# Patient Record
Sex: FEMALE | Race: OTHER
[De-identification: names, ages, dates, MRNs, and addresses within clinical notes are randomized per-mention and may not be internally consistent; named-entity substitution may affect disease eponyms.]

---

## 2020-02-11 ENCOUNTER — HOSPITAL ENCOUNTER (INPATIENT)
Dept: HOSPITAL 54 - GPS | Age: 55
LOS: 17 days | Discharge: NURSING, REHAB OR LONG-TERM CARE | DRG: 885 | End: 2020-02-28
Attending: INTERNAL MEDICINE | Admitting: PSYCHIATRY & NEUROLOGY
Payer: MEDICARE

## 2020-02-11 VITALS — WEIGHT: 95 LBS | BODY MASS INDEX: 18.65 KG/M2 | HEIGHT: 60 IN

## 2020-02-11 VITALS — DIASTOLIC BLOOD PRESSURE: 62 MMHG | SYSTOLIC BLOOD PRESSURE: 115 MMHG

## 2020-02-11 DIAGNOSIS — F41.9: ICD-10-CM

## 2020-02-11 DIAGNOSIS — Z73.6: ICD-10-CM

## 2020-02-11 DIAGNOSIS — Z91.14: ICD-10-CM

## 2020-02-11 DIAGNOSIS — E22.1: ICD-10-CM

## 2020-02-11 DIAGNOSIS — E88.09: ICD-10-CM

## 2020-02-11 DIAGNOSIS — R45.851: ICD-10-CM

## 2020-02-11 DIAGNOSIS — R73.9: ICD-10-CM

## 2020-02-11 DIAGNOSIS — F29: ICD-10-CM

## 2020-02-11 DIAGNOSIS — E44.0: ICD-10-CM

## 2020-02-11 DIAGNOSIS — F33.3: Primary | ICD-10-CM

## 2020-02-11 DIAGNOSIS — I10: ICD-10-CM

## 2020-02-11 NOTE — NUR
PATIENT IS A 54 YEAR OLD  FEMALE BROUGHT INTO THE HOSPITAL BY AMBULANCE FROM [facility]. PATIENT IS ADMITTED ON A 5150 HOLD FOR GRAVELY DISABLED. PATIENT FOR THE 
LAST 2-3 WEEKS HAS BEEN HAVING NEW ONSET AUDITORY HALLUCINATIONS OF MALE VOICE THREATENING 
HER THAT SHE WILL BE KILLED OR HURT IF SHE EATS OR DRINKS ANYTHING. PATIENT HAS BEEN EATING 
AND DRINKING VERY LITTLE FOR THE LAST FEW DAYS. UPON FACE TO FACE ASSESSMENT PATIENT IS 
PARANOID WITH DELAYED SPEECH. PATIENT SEEMS TO BE REACTING TO INTERNAL STIMULI. PATIENT 
DENIES SI/HI AND VISUAL HALLUCINATIONS. INFORMED DR SCHAEFER AND JEAN MARIE URBANO NP OF 
ADMISSION. PATIENTS RIGHTS HANDBOOK GIVEN AND GUIDE TO PRESCRIPTIONS GIVEN. PATIENT SIGNED 
ADMISSION PAPERWORK. BROTHER HAS BEEN NOTIFIED OF ADMISSION. ENVIRONMENTAL CHECK COMPLETE. 
WILL CONTINUE TO MONITOR PT Q15 FOR MOOD, SAFETY AND BEHAVIOR

## 2020-02-12 VITALS — SYSTOLIC BLOOD PRESSURE: 132 MMHG | DIASTOLIC BLOOD PRESSURE: 81 MMHG

## 2020-02-12 VITALS — DIASTOLIC BLOOD PRESSURE: 77 MMHG | SYSTOLIC BLOOD PRESSURE: 118 MMHG

## 2020-02-12 VITALS — SYSTOLIC BLOOD PRESSURE: 119 MMHG | DIASTOLIC BLOOD PRESSURE: 78 MMHG

## 2020-02-12 LAB
ALBUMIN SERPL BCP-MCNC: 3 G/DL (ref 3.4–5)
ALP SERPL-CCNC: 44 U/L (ref 46–116)
ALT SERPL W P-5'-P-CCNC: 14 U/L (ref 12–78)
AST SERPL W P-5'-P-CCNC: 12 U/L (ref 15–37)
BILIRUB SERPL-MCNC: 0.4 MG/DL (ref 0.2–1)
BUN SERPL-MCNC: 14 MG/DL (ref 7–18)
CALCIUM SERPL-MCNC: 8.5 MG/DL (ref 8.5–10.1)
CHLORIDE SERPL-SCNC: 104 MMOL/L (ref 98–107)
CHOLEST SERPL-MCNC: 148 MG/DL (ref ?–200)
CO2 SERPL-SCNC: 29 MMOL/L (ref 21–32)
CREAT SERPL-MCNC: 0.7 MG/DL (ref 0.6–1.3)
GLUCOSE SERPL-MCNC: 107 MG/DL (ref 74–106)
HDLC SERPL-MCNC: 37 MG/DL (ref 40–60)
LDLC SERPL DIRECT ASSAY-MCNC: 99 MG/DL (ref 0–99)
POTASSIUM SERPL-SCNC: 3.5 MMOL/L (ref 3.5–5.1)
PROT SERPL-MCNC: 5.6 G/DL (ref 6.4–8.2)
SODIUM SERPL-SCNC: 139 MMOL/L (ref 136–145)
TRIGL SERPL-MCNC: 76 MG/DL (ref 30–150)

## 2020-02-12 RX ADMIN — CALCIUM CARBONATE-CHOLECALCIFEROL TAB 250 MG-125 UNIT SCH UDTAB: 250-125 TAB at 08:18

## 2020-02-12 RX ADMIN — LORAZEPAM PRN MG: 0.5 TABLET ORAL at 08:19

## 2020-02-12 RX ADMIN — GABAPENTIN SCH MG: 300 CAPSULE ORAL at 18:06

## 2020-02-12 RX ADMIN — DULOXETINE HYDROCHLORIDE SCH MG: 30 CAPSULE, DELAYED RELEASE ORAL at 11:23

## 2020-02-12 RX ADMIN — GABAPENTIN SCH MG: 300 CAPSULE ORAL at 13:20

## 2020-02-12 RX ADMIN — LISINOPRIL SCH MG: 10 TABLET ORAL at 08:29

## 2020-02-12 RX ADMIN — QUETIAPINE SCH MG: 25 TABLET, FILM COATED ORAL at 21:25

## 2020-02-12 RX ADMIN — CALCIUM CARBONATE-CHOLECALCIFEROL TAB 250 MG-125 UNIT SCH UDTAB: 250-125 TAB at 18:06

## 2020-02-12 RX ADMIN — GABAPENTIN SCH MG: 300 CAPSULE ORAL at 08:20
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
## 2020-02-12 NOTE — NUR
Family Contact: SW called the pts brother, Anatoliy ([phone]), and discussed the pts 
treatment and discharge plan. Pts brother provided the SW with a complete history and then 
stated that he is not sure that home alone is a safe option for the pt. SW informed him of 
the medications that the pt is being started on and then stated that we will assess the pts 
discharge plan day by day.

## 2020-02-12 NOTE — NUR
Initial Discharge Plan: Pt currently resides at her apartment alone located at [address], [address] [address]; ([phone]). Per pt, she would like to return to 
his home. AAMIR spoke to the pts brother, Anatoliy ([phone]), who stated that he is not 
sure if that is a safe option at this point. AAMIR will work with the pt and the MD regarding 
appropriate discharge planning. AAMIR will form a safe and proper discharge.

## 2020-02-12 NOTE — NUR
PATIENT RESTING IN BED COMFORTABLY, PT.IS COOPERATIVE, FEELING DEPRESSED, DENIES SI/HI/AVH 
AT THIS TIME. NO AGITATION NOTED.ENCOURAGED FOR VERBALIZATION OF FEELINGS. SAFETY 
PRECAUTIONS IMPLEMENTED. WILL CONTINUE TO MONITOR Q15MIN ROUNDS FOR SAFETY AND BEHAVIOR.

## 2020-02-13 VITALS — SYSTOLIC BLOOD PRESSURE: 123 MMHG | DIASTOLIC BLOOD PRESSURE: 80 MMHG

## 2020-02-13 VITALS — SYSTOLIC BLOOD PRESSURE: 102 MMHG | DIASTOLIC BLOOD PRESSURE: 64 MMHG

## 2020-02-13 VITALS — SYSTOLIC BLOOD PRESSURE: 108 MMHG | DIASTOLIC BLOOD PRESSURE: 56 MMHG

## 2020-02-13 RX ADMIN — QUETIAPINE SCH MG: 25 TABLET, FILM COATED ORAL at 21:12

## 2020-02-13 RX ADMIN — CALCIUM CARBONATE-CHOLECALCIFEROL TAB 250 MG-125 UNIT SCH UDTAB: 250-125 TAB at 08:36

## 2020-02-13 RX ADMIN — GABAPENTIN SCH MG: 300 CAPSULE ORAL at 08:36

## 2020-02-13 RX ADMIN — GABAPENTIN SCH MG: 300 CAPSULE ORAL at 12:54

## 2020-02-13 RX ADMIN — LISINOPRIL SCH MG: 10 TABLET ORAL at 08:36

## 2020-02-13 RX ADMIN — CALCIUM CARBONATE-CHOLECALCIFEROL TAB 250 MG-125 UNIT SCH UDTAB: 250-125 TAB at 16:36

## 2020-02-13 RX ADMIN — GABAPENTIN SCH MG: 300 CAPSULE ORAL at 16:36

## 2020-02-13 RX ADMIN — DULOXETINE HYDROCHLORIDE SCH MG: 30 CAPSULE, DELAYED RELEASE ORAL at 08:36

## 2020-02-13 NOTE — NUR
GPS/NURSING NOTES:



PT. IN HER ROOM AWAKE. NO DISTRESS OR AGITATION NOTED. QUIET AND COOPERATIVE AT THIS TIME. 
CONFUSED AT TIMES. REALITY ORIENTATION DONE. NO C/O PAIN OR DISCOMFORT. SAFETY ENVIRONMENT 
OBSERVED AT ALL TIMES. WILL CONTINUE TO MONITOR Q 15 MIN FOR SAFETY AND BEHAVIOR.

## 2020-02-13 NOTE — NUR
RN NOTES : PT. RESETING IN HER BED, NO ACUTE DISTRESS NOTED , DENIED ANY DISCOMFORT AT THIS 
TIME , IN DURING SHIFT NO BEHAVIOR PROBLEMS NOTED , ENDORSE TO DAY NURSE FOR CONTINUIYT OF 
CARE.

## 2020-02-13 NOTE — NUR
GROUP NOTE: SW encouraged pt to attend group on this present day discussing "discharge 
planning." Pt refused to attend stating, "I'll think about going." SW attempted to provide 
intervention and discuss pts discharge plan and pt responded, "I already have that figured 
out no need to worry."

## 2020-02-14 VITALS — SYSTOLIC BLOOD PRESSURE: 96 MMHG | DIASTOLIC BLOOD PRESSURE: 59 MMHG

## 2020-02-14 VITALS — SYSTOLIC BLOOD PRESSURE: 104 MMHG | DIASTOLIC BLOOD PRESSURE: 65 MMHG

## 2020-02-14 RX ADMIN — LISINOPRIL SCH MG: 10 TABLET ORAL at 08:16

## 2020-02-14 RX ADMIN — GABAPENTIN SCH MG: 300 CAPSULE ORAL at 13:08

## 2020-02-14 RX ADMIN — CALCIUM CARBONATE-CHOLECALCIFEROL TAB 250 MG-125 UNIT SCH UDTAB: 250-125 TAB at 08:15

## 2020-02-14 RX ADMIN — QUETIAPINE SCH MG: 25 TABLET, FILM COATED ORAL at 16:34

## 2020-02-14 RX ADMIN — QUETIAPINE SCH MG: 25 TABLET, FILM COATED ORAL at 09:51

## 2020-02-14 RX ADMIN — LORAZEPAM PRN MG: 0.5 TABLET ORAL at 08:26

## 2020-02-14 RX ADMIN — CALCIUM CARBONATE-CHOLECALCIFEROL TAB 250 MG-125 UNIT SCH UDTAB: 250-125 TAB at 16:34

## 2020-02-14 RX ADMIN — DULOXETINE HYDROCHLORIDE SCH MG: 30 CAPSULE, DELAYED RELEASE ORAL at 08:15

## 2020-02-14 RX ADMIN — GABAPENTIN SCH MG: 300 CAPSULE ORAL at 16:34

## 2020-02-14 RX ADMIN — QUETIAPINE SCH MG: 25 TABLET, FILM COATED ORAL at 22:00

## 2020-02-14 RX ADMIN — GABAPENTIN SCH MG: 300 CAPSULE ORAL at 08:15

## 2020-02-14 NOTE — NUR
GPS OPENING NOTE:



PATIENT IN BED RESTING COMFORTABLY, IN NO APPARENT DISTRESS NOTED. ALERT AND ORIENTED X3, 
PATIENT IS CALM AND COOPERATIVE WITH CARE. SAFETY PRECAUTIONS IMPLEMENTED. BED ALARM ON AND 
IN LOCKED POSITION. WILL CONTINUE TO MONITOR FOR PATIENT'S SAFETY.

## 2020-02-14 NOTE — NUR
GPS NURSING NOTE: 



SCHEDULED SEROQUEL 50MG PO FOR TONIGHT WAS NOT GIVEN/ADMINISTER DUE TO BLOOD PRESSURE 96/59. 
WILL CONTINUE TO MONITOR.

## 2020-02-15 VITALS — DIASTOLIC BLOOD PRESSURE: 72 MMHG | SYSTOLIC BLOOD PRESSURE: 111 MMHG

## 2020-02-15 VITALS — SYSTOLIC BLOOD PRESSURE: 105 MMHG | DIASTOLIC BLOOD PRESSURE: 68 MMHG

## 2020-02-15 VITALS — DIASTOLIC BLOOD PRESSURE: 74 MMHG | SYSTOLIC BLOOD PRESSURE: 124 MMHG

## 2020-02-15 RX ADMIN — CALCIUM CARBONATE-CHOLECALCIFEROL TAB 250 MG-125 UNIT SCH UDTAB: 250-125 TAB at 17:17

## 2020-02-15 RX ADMIN — LISINOPRIL SCH MG: 10 TABLET ORAL at 08:20

## 2020-02-15 RX ADMIN — QUETIAPINE SCH MG: 25 TABLET, FILM COATED ORAL at 08:19

## 2020-02-15 RX ADMIN — GABAPENTIN SCH MG: 300 CAPSULE ORAL at 13:29

## 2020-02-15 RX ADMIN — DULOXETINE HYDROCHLORIDE SCH MG: 30 CAPSULE, DELAYED RELEASE ORAL at 08:19

## 2020-02-15 RX ADMIN — GABAPENTIN SCH MG: 300 CAPSULE ORAL at 08:19

## 2020-02-15 RX ADMIN — GABAPENTIN SCH MG: 300 CAPSULE ORAL at 17:17

## 2020-02-15 RX ADMIN — CALCIUM CARBONATE-CHOLECALCIFEROL TAB 250 MG-125 UNIT SCH UDTAB: 250-125 TAB at 08:19

## 2020-02-15 RX ADMIN — QUETIAPINE SCH MG: 25 TABLET, FILM COATED ORAL at 21:07

## 2020-02-15 RX ADMIN — QUETIAPINE SCH MG: 25 TABLET, FILM COATED ORAL at 17:17

## 2020-02-15 NOTE — NUR
GPS RN CLOSING NOTE:



PATIENT IN BED ASLEEP, AROUSES EASILY. NO ACUTE DISTRESS NOTED. DENIES PAIN OR DISCOMFORT. 
NO AGITATION NOTED. SAFETY PRECAUTIONS IMPLEMENTED. BED ALARM ON AND IN LOCKED POSITION. 
WILL CONTINUE TO MONITOR FOR PT'S SAFETY.

## 2020-02-15 NOTE — NUR
GPS OPENING NOTE:



PATIENT IN BED RESTING COMFORTABLY, IN NO APPARENT DISTRESS NOTED. ALERT AND ORIENTED X3, 
PATIENT IS CALM, COOPERATIVE WITH CARE. FEELING DEPRESSED, ISOLATIVE. VERBALIZATION OF 
FEELINGS ENCOURAGED. SAFETY PRECAUTIONS IMPLEMENTED. BED ALARM ON AND IN LOCKED POSITION. 
WILL CONTINUE TO MONITOR FOR PATIENT'S SAFETY.

## 2020-02-16 VITALS — DIASTOLIC BLOOD PRESSURE: 83 MMHG | SYSTOLIC BLOOD PRESSURE: 153 MMHG

## 2020-02-16 VITALS — SYSTOLIC BLOOD PRESSURE: 134 MMHG | DIASTOLIC BLOOD PRESSURE: 88 MMHG

## 2020-02-16 VITALS — DIASTOLIC BLOOD PRESSURE: 79 MMHG | SYSTOLIC BLOOD PRESSURE: 118 MMHG

## 2020-02-16 LAB
BASOPHILS # BLD AUTO: 0 /CMM (ref 0–0.2)
BASOPHILS NFR BLD AUTO: 0.6 % (ref 0–2)
BUN SERPL-MCNC: 15 MG/DL (ref 7–18)
CALCIUM SERPL-MCNC: 9 MG/DL (ref 8.5–10.1)
CHLORIDE SERPL-SCNC: 102 MMOL/L (ref 98–107)
CO2 SERPL-SCNC: 28 MMOL/L (ref 21–32)
CREAT SERPL-MCNC: 0.8 MG/DL (ref 0.6–1.3)
EOSINOPHIL NFR BLD AUTO: 1.5 % (ref 0–6)
GLUCOSE SERPL-MCNC: 133 MG/DL (ref 74–106)
HCT VFR BLD AUTO: 44 % (ref 33–45)
HGB BLD-MCNC: 14.7 G/DL (ref 11.5–14.8)
LYMPHOCYTES NFR BLD AUTO: 0.9 /CMM (ref 0.8–4.8)
LYMPHOCYTES NFR BLD AUTO: 12.9 % (ref 20–44)
MCHC RBC AUTO-ENTMCNC: 33 G/DL (ref 31–36)
MCV RBC AUTO: 92 FL (ref 82–100)
MONOCYTES NFR BLD AUTO: 0.5 /CMM (ref 0.1–1.3)
MONOCYTES NFR BLD AUTO: 7.6 % (ref 2–12)
NEUTROPHILS # BLD AUTO: 5.3 /CMM (ref 1.8–8.9)
NEUTROPHILS NFR BLD AUTO: 77.4 % (ref 43–81)
PLATELET # BLD AUTO: 244 /CMM (ref 150–450)
POTASSIUM SERPL-SCNC: 4.2 MMOL/L (ref 3.5–5.1)
RBC # BLD AUTO: 4.8 MIL/UL (ref 4–5.2)
SODIUM SERPL-SCNC: 140 MMOL/L (ref 136–145)
WBC NRBC COR # BLD AUTO: 6.9 K/UL (ref 4.3–11)

## 2020-02-16 RX ADMIN — GABAPENTIN SCH MG: 300 CAPSULE ORAL at 12:37

## 2020-02-16 RX ADMIN — QUETIAPINE SCH MG: 25 TABLET, FILM COATED ORAL at 09:02

## 2020-02-16 RX ADMIN — QUETIAPINE SCH MG: 25 TABLET, FILM COATED ORAL at 21:18

## 2020-02-16 RX ADMIN — QUETIAPINE SCH MG: 25 TABLET, FILM COATED ORAL at 13:10

## 2020-02-16 RX ADMIN — GABAPENTIN SCH MG: 300 CAPSULE ORAL at 17:48

## 2020-02-16 RX ADMIN — DULOXETINE HYDROCHLORIDE SCH MG: 30 CAPSULE, DELAYED RELEASE ORAL at 09:01

## 2020-02-16 RX ADMIN — LISINOPRIL SCH MG: 10 TABLET ORAL at 09:03

## 2020-02-16 RX ADMIN — QUETIAPINE SCH MG: 25 TABLET, FILM COATED ORAL at 17:48

## 2020-02-16 RX ADMIN — GABAPENTIN SCH MG: 300 CAPSULE ORAL at 09:01

## 2020-02-16 RX ADMIN — CALCIUM CARBONATE-CHOLECALCIFEROL TAB 250 MG-125 UNIT SCH UDTAB: 250-125 TAB at 09:06

## 2020-02-16 RX ADMIN — CALCIUM CARBONATE-CHOLECALCIFEROL TAB 250 MG-125 UNIT SCH UDTAB: 250-125 TAB at 17:48

## 2020-02-17 VITALS — DIASTOLIC BLOOD PRESSURE: 77 MMHG | SYSTOLIC BLOOD PRESSURE: 137 MMHG

## 2020-02-17 VITALS — DIASTOLIC BLOOD PRESSURE: 53 MMHG | SYSTOLIC BLOOD PRESSURE: 82 MMHG

## 2020-02-17 VITALS — SYSTOLIC BLOOD PRESSURE: 74 MMHG | DIASTOLIC BLOOD PRESSURE: 48 MMHG

## 2020-02-17 VITALS — SYSTOLIC BLOOD PRESSURE: 90 MMHG | DIASTOLIC BLOOD PRESSURE: 58 MMHG

## 2020-02-17 VITALS — SYSTOLIC BLOOD PRESSURE: 86 MMHG | DIASTOLIC BLOOD PRESSURE: 54 MMHG

## 2020-02-17 RX ADMIN — GABAPENTIN SCH MG: 300 CAPSULE ORAL at 17:00

## 2020-02-17 RX ADMIN — CALCIUM CARBONATE-CHOLECALCIFEROL TAB 250 MG-125 UNIT SCH UDTAB: 250-125 TAB at 17:54

## 2020-02-17 RX ADMIN — LISINOPRIL SCH MG: 10 TABLET ORAL at 08:59

## 2020-02-17 RX ADMIN — QUETIAPINE SCH MG: 25 TABLET, FILM COATED ORAL at 09:29

## 2020-02-17 RX ADMIN — QUETIAPINE SCH MG: 25 TABLET, FILM COATED ORAL at 12:28

## 2020-02-17 RX ADMIN — QUETIAPINE SCH MG: 25 TABLET, FILM COATED ORAL at 08:10

## 2020-02-17 RX ADMIN — DULOXETINE HYDROCHLORIDE SCH MG: 30 CAPSULE, DELAYED RELEASE ORAL at 08:10

## 2020-02-17 RX ADMIN — QUETIAPINE SCH MG: 25 TABLET, FILM COATED ORAL at 17:00

## 2020-02-17 RX ADMIN — CALCIUM CARBONATE-CHOLECALCIFEROL TAB 250 MG-125 UNIT SCH UDTAB: 250-125 TAB at 08:10

## 2020-02-17 RX ADMIN — GABAPENTIN SCH MG: 300 CAPSULE ORAL at 12:29

## 2020-02-17 RX ADMIN — LORAZEPAM PRN MG: 0.5 TABLET ORAL at 08:11

## 2020-02-17 RX ADMIN — QUETIAPINE SCH MG: 25 TABLET, FILM COATED ORAL at 21:21

## 2020-02-17 RX ADMIN — GABAPENTIN SCH MG: 300 CAPSULE ORAL at 08:10

## 2020-02-17 NOTE — NUR
GPS RN NOTE: BLOOD PRESSURE



PATIENT AWAKE, ALERT AND ORIENTED X 3, CALM, COOPERATIVE, NO AGITATION NOTED, DENIES SI/HI, 
BP=90/57 P=87 R=20. NO SOB, NO ACUTE DISTRESS, BREATHING EVEN AND UNLABORED, NO S/S OF PAIN 
AND DISCOMFORT, WILL CONTINUE TO MONITOR Q15 MINS FOR SAFETY

## 2020-02-17 NOTE — NUR
GPS RN NOTE: OPENING NOTES

RECEIVED PATIENT ASLEEP IN BED, ARAUSABLE TO VERBAL AND TACTILE STIMULI, ALERT AND ORIENTED 
X 2-3, CALM, DEPRESSED MOOD, HEARING VOICES AND STATED "THEY LET ME DO THINGS". REALITY 
ORIENTATION PROVIDED, REDIRECTED THE PATIENT, DENIES SI/HI, ON IV BOLUS D/T LOW BLOOD 
PRESSURE, IV SITE INTACT AND PATENT WITH NO S/S OF INFILTRATION NOTED. LATEST BP=79/53 P=70 
R=20 P/L=O,  NO SOB, NO ACUTE DISTRESS, BREATHING EVEN AND UNLABORED, NO S/S OF PAIN AND 
DISCOMFORT, WILL CONTINUE TO MONITOR Q15 MINS FOR SAFETY

## 2020-02-17 NOTE — NUR
GPS NURSING NOTE:

RECEIVED PT IN BED RESTING. A/OX3, NO APPARENT DISTRESS NOTED. PT.IS

COOPERATIVE WITH CARE AND COMPLIANT WITH DAILY MEDS, PATIENT REPORTS AUDITORY HALLUCINATIONS 
THAT ARE THREATENING. VOICE TELLING HER NOT TO EAT BUT SHE WAS ABLE TO EAT A FULL MEAL FOR 
BREAKFAST. PATIENT STATED FEELING SUICIDAL DUE TO AUDITORY HALLUCINATIONS BUT DENIES PLAN. 
VERBALIZATION OF FEELINGS ENCOURAGED. SAFETY PRECAUTIONS IMPLEMENTED. WILL CONTINUE TO 
MONITOR Q15MIN  FOR SAFETY AND BEHAVIOR.

## 2020-02-17 NOTE — NUR
DR SILVA AWARE OF BP DROPPED 77/50. ORDERED COFFEE. ADMINISTERED. PATIENT BP INCREASED 
TO 92/50. INCREASED PO INTAKE. AT 1830 BP DROPPED TO 74/56. DR. SILVA PAGED AGAIN

## 2020-02-17 NOTE — NUR
gps rn note;

hold psych meds per Dr Khan until Dr Khan is able to see pt tomorrow

bp currently 92/50

## 2020-02-18 VITALS — DIASTOLIC BLOOD PRESSURE: 84 MMHG | SYSTOLIC BLOOD PRESSURE: 126 MMHG

## 2020-02-18 VITALS — DIASTOLIC BLOOD PRESSURE: 72 MMHG | SYSTOLIC BLOOD PRESSURE: 113 MMHG

## 2020-02-18 VITALS — SYSTOLIC BLOOD PRESSURE: 120 MMHG | DIASTOLIC BLOOD PRESSURE: 73 MMHG

## 2020-02-18 RX ADMIN — LISINOPRIL SCH MG: 10 TABLET ORAL at 09:00

## 2020-02-18 RX ADMIN — GABAPENTIN SCH MG: 300 CAPSULE ORAL at 17:46

## 2020-02-18 RX ADMIN — GABAPENTIN SCH MG: 300 CAPSULE ORAL at 09:00

## 2020-02-18 RX ADMIN — CALCIUM CARBONATE-CHOLECALCIFEROL TAB 250 MG-125 UNIT SCH UDTAB: 250-125 TAB at 17:46

## 2020-02-18 RX ADMIN — DULOXETINE HYDROCHLORIDE SCH MG: 30 CAPSULE, DELAYED RELEASE ORAL at 09:00

## 2020-02-18 RX ADMIN — CALCIUM CARBONATE-CHOLECALCIFEROL TAB 250 MG-125 UNIT SCH UDTAB: 250-125 TAB at 09:00

## 2020-02-18 RX ADMIN — GABAPENTIN SCH MG: 300 CAPSULE ORAL at 13:00

## 2020-02-18 RX ADMIN — OLANZAPINE SCH MG: 2.5 TABLET ORAL at 17:46

## 2020-02-18 RX ADMIN — OLANZAPINE SCH MG: 2.5 TABLET ORAL at 10:00

## 2020-02-18 NOTE — NUR
GPS RN OPENING NOTE:

RECEIVED PATIENT AWAKE, ALERT AND ORIENTED X 2-3 CALM, COOPERATIVE, PACING, DEPRESSED MOOD, 
DENIES SI/HI , HEARING VOICES OF PEOPLE THREATENING HER, TELLING HER TO HURT HERSELF, STAY 
IN BED AND RESTRICT HER ACTIVITES. PATIENT CONTRACTED FOR SAFETY. DENIES VH. NO SOB, NO 
ACUTE DISTRESS, BREATHING EVEN AND UNLABORED, NO S/S OF PAIN AND DISCOMFORT, ENCOURAGE THE 
PATIENT TO VERBALIZE HER FEELINGS, REDIRECTED THE PATIENT,  WILL CONTINUE TO MONITOR Q15 
MINS FOR SAFETY

## 2020-02-18 NOTE — NUR
Group Note: SW encouraged pt to attend group therapy on 2/18/20 at 2pm discussing aggressive 
behaviors and triggers. Pt refused to attend group and stated that she did not feel 
connected to this topic. SW asked the pt to simply talk about her triggers and the pt stated 
that she is unaware of her triggers. She stated that when she talks about traumatic events 
in her life she will slam her hands down and clench them. She stated that she does not get 
aggressive with others.

## 2020-02-19 VITALS — DIASTOLIC BLOOD PRESSURE: 77 MMHG | SYSTOLIC BLOOD PRESSURE: 118 MMHG

## 2020-02-19 VITALS — DIASTOLIC BLOOD PRESSURE: 65 MMHG | SYSTOLIC BLOOD PRESSURE: 104 MMHG

## 2020-02-19 VITALS — SYSTOLIC BLOOD PRESSURE: 111 MMHG | DIASTOLIC BLOOD PRESSURE: 74 MMHG

## 2020-02-19 RX ADMIN — LISINOPRIL SCH MG: 10 TABLET ORAL at 08:51

## 2020-02-19 RX ADMIN — OLANZAPINE SCH MG: 2.5 TABLET ORAL at 08:50

## 2020-02-19 RX ADMIN — CALCIUM CARBONATE-CHOLECALCIFEROL TAB 250 MG-125 UNIT SCH UDTAB: 250-125 TAB at 08:51

## 2020-02-19 RX ADMIN — GABAPENTIN SCH MG: 300 CAPSULE ORAL at 16:31

## 2020-02-19 RX ADMIN — GABAPENTIN SCH MG: 300 CAPSULE ORAL at 13:29

## 2020-02-19 RX ADMIN — ZIPRASIDONE HYDROCHLORIDE SCH MG: 20 CAPSULE ORAL at 16:31

## 2020-02-19 RX ADMIN — GABAPENTIN SCH MG: 300 CAPSULE ORAL at 08:50

## 2020-02-19 RX ADMIN — DULOXETINE HYDROCHLORIDE SCH MG: 30 CAPSULE, DELAYED RELEASE ORAL at 08:51

## 2020-02-19 RX ADMIN — CALCIUM CARBONATE-CHOLECALCIFEROL TAB 250 MG-125 UNIT SCH UDTAB: 250-125 TAB at 16:31

## 2020-02-19 RX ADMIN — LORAZEPAM PRN MG: 0.5 TABLET ORAL at 01:48

## 2020-02-19 NOTE — NUR
Probable Cause Hearing Notification: SW called the pts brother, Anatoliy ([phone]), and 
informed him about the hearing and what it entails and the possible outcomes. He informed 
the SW about some comments that the pt had made to him two days ago. SW stated that she will 
call him back and give him the results of the hearing.

## 2020-02-19 NOTE — NUR
Group Note: SW invited the patient to attend group therapy on 02/19/2020 1pm to discuss what 
they would like to see change as a result of their stay in [facility]. The patient refused stating, 
" I don't want to go, I just want to rest". SW encouraged patient to attend and sit in if 
they do not feel comfortable speaking. Patient declined and stated "I will try to attend 
next time".

## 2020-02-19 NOTE — NUR
GPS RN NOTE: HEARING VOICES

PATIENT NOTED FOR TALKING TO SELF, AGITATED AND BANGING THE SIDE RAILS. SPOKE TO THE PATIENT 
AND PATIENT STATED THAT SHE IS HEARING VOICES OF THREATENING HER. REDIRECT THE PATIENT, 
EXPLANATION AND  REALITY ORIENTATION PROVIDED. ATIVAN 1MG PO GIVEN AND PATIENT CALM DOWN 
POST 30 MINS. WILL CONTINUE TO [id number] MINS FOR SAFETY

## 2020-02-19 NOTE — NUR
Family Contact: SW called the pts brother, Anatoliy ([phone]), and left a voicemail 
stating that the pts hold was upheld.

## 2020-02-20 VITALS — DIASTOLIC BLOOD PRESSURE: 72 MMHG | SYSTOLIC BLOOD PRESSURE: 120 MMHG

## 2020-02-20 VITALS — DIASTOLIC BLOOD PRESSURE: 83 MMHG | SYSTOLIC BLOOD PRESSURE: 116 MMHG

## 2020-02-20 VITALS — DIASTOLIC BLOOD PRESSURE: 59 MMHG | SYSTOLIC BLOOD PRESSURE: 103 MMHG

## 2020-02-20 RX ADMIN — LISINOPRIL SCH MG: 10 TABLET ORAL at 08:24

## 2020-02-20 RX ADMIN — LORAZEPAM PRN MG: 0.5 TABLET ORAL at 21:21

## 2020-02-20 RX ADMIN — CALCIUM CARBONATE-CHOLECALCIFEROL TAB 250 MG-125 UNIT SCH UDTAB: 250-125 TAB at 17:05

## 2020-02-20 RX ADMIN — ZIPRASIDONE HYDROCHLORIDE SCH MG: 20 CAPSULE ORAL at 08:23

## 2020-02-20 RX ADMIN — ZIPRASIDONE HYDROCHLORIDE SCH MG: 20 CAPSULE ORAL at 17:05

## 2020-02-20 RX ADMIN — CALCIUM CARBONATE-CHOLECALCIFEROL TAB 250 MG-125 UNIT SCH UDTAB: 250-125 TAB at 08:23

## 2020-02-20 RX ADMIN — LORAZEPAM PRN MG: 0.5 TABLET ORAL at 14:51

## 2020-02-20 RX ADMIN — GABAPENTIN SCH MG: 300 CAPSULE ORAL at 08:23

## 2020-02-20 RX ADMIN — GABAPENTIN SCH MG: 300 CAPSULE ORAL at 13:04

## 2020-02-20 RX ADMIN — GABAPENTIN SCH MG: 300 CAPSULE ORAL at 17:05

## 2020-02-20 RX ADMIN — LORAZEPAM PRN MG: 0.5 TABLET ORAL at 06:52

## 2020-02-20 RX ADMIN — VENLAFAXINE HYDROCHLORIDE SCH MG: 75 CAPSULE, EXTENDED RELEASE ORAL at 08:23

## 2020-02-20 NOTE — NUR
GPS NURSING NOTE: AUDITORY HALLUCINATIONS AND AGITATION



PATIENT EXHIBITING AGITATION AND ANXIETY DUE TO AUDITORY HALLUCINATIONS. SHE IS BANGING HER 
HAND AGAINST THE BED. PATIENT IS EXPERIENCING COMMAND HALLUCINATIONS THAT SHE DESCRIBES AS 
"THREATENING". ATIVAN 1MG PO ADMINISTERED FOR SYMPTOMS. SHE STATED THE ATIVAN HELPED HER 
THIS MORNING AS WELL. WILL CONTINUE TO MONITOR Q15 FOR MOOD, SAFETY AND BEHAVIOR

## 2020-02-20 NOTE — NUR
GPS RN NURSING NOTE: OPENING NOTE



RECEIVED PT IN BED RESTING. A/OX3 PT IS C/O AUDITORY HALLUCINATIONS THAT SHE REPORTS ARE 
"THREATENING". PATIENT IS CLEARLY REACTING TO INTERNAL STIMULI AND IS SEEN TALKING ABOUT 
BANGING THE SIDE RAILS OF HER BED AS WELL AS TAPPING HER HAND AGAINST HER THIGH. PATIENT IS 
COOPERATIVE WITH CARE AND COMPLIANT WITH DAILY MEDS, VERBALIZATION OF FEELINGS ENCOURAGED. 
SAFETY PRECAUTIONS IMPLEMENTED. WILL CONTINUE TO MONITOR Q15MIN  FOR SAFETY AND BEHAVIOR.

## 2020-02-21 VITALS — SYSTOLIC BLOOD PRESSURE: 124 MMHG | DIASTOLIC BLOOD PRESSURE: 82 MMHG

## 2020-02-21 VITALS — SYSTOLIC BLOOD PRESSURE: 110 MMHG | DIASTOLIC BLOOD PRESSURE: 70 MMHG

## 2020-02-21 VITALS — SYSTOLIC BLOOD PRESSURE: 113 MMHG | DIASTOLIC BLOOD PRESSURE: 72 MMHG

## 2020-02-21 RX ADMIN — LORAZEPAM PRN MG: 0.5 TABLET ORAL at 20:40

## 2020-02-21 RX ADMIN — GABAPENTIN SCH MG: 300 CAPSULE ORAL at 08:17

## 2020-02-21 RX ADMIN — GABAPENTIN SCH MG: 300 CAPSULE ORAL at 12:09

## 2020-02-21 RX ADMIN — MAGNESIUM HYDROXIDE PRN ML: 400 SUSPENSION ORAL at 00:45

## 2020-02-21 RX ADMIN — POLYETHYLENE GLYCOL 3350 SCH GM: 17 POWDER, FOR SOLUTION ORAL at 10:30

## 2020-02-21 RX ADMIN — Medication SCH MG: at 12:00

## 2020-02-21 RX ADMIN — ZIPRASIDONE HYDROCHLORIDE SCH MG: 20 CAPSULE ORAL at 08:17

## 2020-02-21 RX ADMIN — CALCIUM CARBONATE-CHOLECALCIFEROL TAB 250 MG-125 UNIT SCH UDTAB: 250-125 TAB at 16:25

## 2020-02-21 RX ADMIN — ZIPRASIDONE HYDROCHLORIDE SCH MG: 20 CAPSULE ORAL at 16:22

## 2020-02-21 RX ADMIN — POLYETHYLENE GLYCOL 3350 SCH GM: 17 POWDER, FOR SOLUTION ORAL at 21:01

## 2020-02-21 RX ADMIN — LISINOPRIL SCH MG: 10 TABLET ORAL at 09:00

## 2020-02-21 RX ADMIN — VENLAFAXINE HYDROCHLORIDE SCH MG: 75 CAPSULE, EXTENDED RELEASE ORAL at 08:17

## 2020-02-21 RX ADMIN — GABAPENTIN SCH MG: 300 CAPSULE ORAL at 16:25

## 2020-02-21 RX ADMIN — CALCIUM CARBONATE-CHOLECALCIFEROL TAB 250 MG-125 UNIT SCH UDTAB: 250-125 TAB at 08:17
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
## 2020-02-21 NOTE — NUR
GPS/RN-NOTES

DR. SCHAEFER IN THE EUNIT WITH VERBAL ORDER TO CONTINUE  PATIENT ON DOSTINEX 0.5MG P.O  Q WEEK 
DUE TO PATIENT  PROLACTIN LEVEL 0F 99.9. NOTED AND CARRIED OUT.WILL LET CRISTIAN MCMANUS REGARDING 
THE MEDICATION.

## 2020-02-21 NOTE — NUR
GROUP NOTE: SW encouraged pt to attend group on this present day discussing "suicidal 
ideation." Pt refused stating she is unable to leave her room because she is in [facility] and 
prisoners can't leave. Pt is paranoid and appeared fearful. SW will continue to validate her 
feelings of distress in a nurturing manner once daily.

## 2020-02-21 NOTE — NUR
RN NOTES



PT REFUSING ADMINISTRATION OF NOON TIME PARLODEL. pT STATES THAT THE DRUG HAS  BEEN 
DISCUSSED WITH THEIR ENDOCRINOLOGIEST AND IT WAS RECOMMENDED THAT SHE NOT TAKE THE PARLODEL 
WITH HER CURRENT TREATMENT. BENEFITS,/REWARDS/ RISKS AND SIDE EFFECTS APPLIED TO EXPLAINED 
TO PT. pT VERBALIZES UNDERSTANDING OF EDUCATION YET CONTINUES TO INSIST ON MEDICATION 
REFUSAL.  WILL ILL CONTINUE TO MONITOR\.

## 2020-02-22 VITALS — SYSTOLIC BLOOD PRESSURE: 112 MMHG | DIASTOLIC BLOOD PRESSURE: 63 MMHG

## 2020-02-22 VITALS — DIASTOLIC BLOOD PRESSURE: 72 MMHG | SYSTOLIC BLOOD PRESSURE: 119 MMHG

## 2020-02-22 VITALS — DIASTOLIC BLOOD PRESSURE: 63 MMHG | SYSTOLIC BLOOD PRESSURE: 100 MMHG

## 2020-02-22 RX ADMIN — GABAPENTIN SCH MG: 300 CAPSULE ORAL at 13:18

## 2020-02-22 RX ADMIN — CALCIUM CARBONATE-CHOLECALCIFEROL TAB 250 MG-125 UNIT SCH UDTAB: 250-125 TAB at 17:13

## 2020-02-22 RX ADMIN — VENLAFAXINE HYDROCHLORIDE SCH MG: 75 CAPSULE, EXTENDED RELEASE ORAL at 09:05

## 2020-02-22 RX ADMIN — MAGNESIUM HYDROXIDE PRN ML: 400 SUSPENSION ORAL at 11:27

## 2020-02-22 RX ADMIN — LORAZEPAM PRN MG: 1 TABLET ORAL at 11:31

## 2020-02-22 RX ADMIN — GABAPENTIN SCH MG: 300 CAPSULE ORAL at 09:05

## 2020-02-22 RX ADMIN — GABAPENTIN SCH MG: 300 CAPSULE ORAL at 17:13

## 2020-02-22 RX ADMIN — ZIPRASIDONE HYDROCHLORIDE SCH MG: 20 CAPSULE ORAL at 17:13

## 2020-02-22 RX ADMIN — ZIPRASIDONE HYDROCHLORIDE SCH MG: 20 CAPSULE ORAL at 09:05

## 2020-02-22 RX ADMIN — CALCIUM CARBONATE-CHOLECALCIFEROL TAB 250 MG-125 UNIT SCH UDTAB: 250-125 TAB at 09:05

## 2020-02-22 RX ADMIN — LISINOPRIL SCH MG: 10 TABLET ORAL at 09:00

## 2020-02-22 RX ADMIN — Medication SCH MG: at 09:05

## 2020-02-22 RX ADMIN — POLYETHYLENE GLYCOL 3350 SCH GM: 17 POWDER, FOR SOLUTION ORAL at 22:23

## 2020-02-22 NOTE — NUR
DR. SCHAEFER HERE AND SPOKE TO PT. REGARDING LACK OF MED COMPLIANCE. REFUSED PARLODEL AND ONLY 
TOOK 40 MG OF GEODON.STATES SHE WILL TAKE PARLODEL NOW.

## 2020-02-23 VITALS — SYSTOLIC BLOOD PRESSURE: 90 MMHG | DIASTOLIC BLOOD PRESSURE: 53 MMHG

## 2020-02-23 VITALS — DIASTOLIC BLOOD PRESSURE: 65 MMHG | SYSTOLIC BLOOD PRESSURE: 94 MMHG

## 2020-02-23 VITALS — SYSTOLIC BLOOD PRESSURE: 119 MMHG | DIASTOLIC BLOOD PRESSURE: 75 MMHG

## 2020-02-23 VITALS — DIASTOLIC BLOOD PRESSURE: 67 MMHG | SYSTOLIC BLOOD PRESSURE: 98 MMHG

## 2020-02-23 RX ADMIN — POLYETHYLENE GLYCOL 3350 SCH GM: 17 POWDER, FOR SOLUTION ORAL at 21:41

## 2020-02-23 RX ADMIN — ZIPRASIDONE HYDROCHLORIDE SCH MG: 20 CAPSULE ORAL at 08:24

## 2020-02-23 RX ADMIN — OLANZAPINE SCH MG: 5 TABLET, FILM COATED ORAL at 13:00

## 2020-02-23 RX ADMIN — CALCIUM CARBONATE-CHOLECALCIFEROL TAB 250 MG-125 UNIT SCH UDTAB: 250-125 TAB at 16:25

## 2020-02-23 RX ADMIN — VENLAFAXINE HYDROCHLORIDE SCH MG: 75 CAPSULE, EXTENDED RELEASE ORAL at 08:24

## 2020-02-23 RX ADMIN — OLANZAPINE SCH MG: 5 TABLET, FILM COATED ORAL at 21:42

## 2020-02-23 RX ADMIN — MAGNESIUM HYDROXIDE PRN ML: 400 SUSPENSION ORAL at 15:27

## 2020-02-23 RX ADMIN — LORAZEPAM PRN MG: 1 TABLET ORAL at 10:29

## 2020-02-23 RX ADMIN — CALCIUM CARBONATE-CHOLECALCIFEROL TAB 250 MG-125 UNIT SCH UDTAB: 250-125 TAB at 08:24

## 2020-02-23 RX ADMIN — GABAPENTIN SCH MG: 300 CAPSULE ORAL at 16:25

## 2020-02-23 RX ADMIN — LISINOPRIL SCH MG: 10 TABLET ORAL at 08:24

## 2020-02-23 RX ADMIN — GABAPENTIN SCH MG: 300 CAPSULE ORAL at 08:24

## 2020-02-23 RX ADMIN — Medication SCH MG: at 08:26

## 2020-02-23 RX ADMIN — GABAPENTIN SCH MG: 300 CAPSULE ORAL at 12:02

## 2020-02-23 NOTE — NUR
PATIENT AWAKE, ALERT AND ORIENTED X 2-3 CALM, HEARING VOICES  TELLING HER TO HURT HERSELF, 
DENIES SI/HI,ISOLATIVE DEPRESSED MOOD PATIENT CONTRACTED FOR SAFETY. DENIES PAIN.NO SOB, NO 
ACUTE DISTRESS,BREATHING EVEN AND UNLABORED, NO S/S OF PAIN AND DISCOMFORT,WILL CONTINUE TO 
MONITOR Q15 MINS FOR SAFETY

## 2020-02-23 NOTE — NUR
GPS RN NOTE:

PT HEARING VOICES HITTING ON THE BED, ATIVAN 1MG PO PRN GIVEN WILL CONTINUE MONITORING

## 2020-02-24 VITALS — SYSTOLIC BLOOD PRESSURE: 102 MMHG | DIASTOLIC BLOOD PRESSURE: 67 MMHG

## 2020-02-24 VITALS — DIASTOLIC BLOOD PRESSURE: 92 MMHG | SYSTOLIC BLOOD PRESSURE: 107 MMHG

## 2020-02-24 VITALS — SYSTOLIC BLOOD PRESSURE: 105 MMHG | DIASTOLIC BLOOD PRESSURE: 62 MMHG

## 2020-02-24 RX ADMIN — VENLAFAXINE HYDROCHLORIDE SCH MG: 75 CAPSULE, EXTENDED RELEASE ORAL at 08:33

## 2020-02-24 RX ADMIN — LISINOPRIL SCH MG: 10 TABLET ORAL at 08:34

## 2020-02-24 RX ADMIN — GABAPENTIN SCH MG: 300 CAPSULE ORAL at 08:34

## 2020-02-24 RX ADMIN — LORAZEPAM PRN MG: 1 TABLET ORAL at 18:15

## 2020-02-24 RX ADMIN — GABAPENTIN SCH MG: 300 CAPSULE ORAL at 17:18

## 2020-02-24 RX ADMIN — POLYETHYLENE GLYCOL 3350 SCH GM: 17 POWDER, FOR SOLUTION ORAL at 21:36

## 2020-02-24 RX ADMIN — Medication SCH MG: at 08:35

## 2020-02-24 RX ADMIN — CALCIUM CARBONATE-CHOLECALCIFEROL TAB 250 MG-125 UNIT SCH UDTAB: 250-125 TAB at 17:18

## 2020-02-24 RX ADMIN — OLANZAPINE SCH MG: 5 TABLET, FILM COATED ORAL at 21:36

## 2020-02-24 RX ADMIN — CALCIUM CARBONATE-CHOLECALCIFEROL TAB 250 MG-125 UNIT SCH UDTAB: 250-125 TAB at 08:33

## 2020-02-24 RX ADMIN — LORAZEPAM PRN MG: 1 TABLET ORAL at 05:56

## 2020-02-24 RX ADMIN — OLANZAPINE SCH MG: 5 TABLET, FILM COATED ORAL at 08:37

## 2020-02-24 RX ADMIN — MAGNESIUM HYDROXIDE PRN ML: 400 SUSPENSION ORAL at 17:18

## 2020-02-24 RX ADMIN — GABAPENTIN SCH MG: 300 CAPSULE ORAL at 12:48

## 2020-02-24 NOTE — NUR
RN NOTE:PATIENT TALKING TO HERSELF AND RESPONDING TO INTERNAL STIMULI HITTING HERSELF IN HER 
LEGS AND ARMS  PATIENT STATED "SHE IS TRYING TO HURT ME ,VOICES TELLING ME BAD THINGS 
". CALLED WITH NEW ORDER ZYPREXA 5MG IM PATIENT VOLUNTARILY ACCEPT ZYPREXA 5MG IM 
,NO PHYSICAL HOLD  WILL CONTINUE TO MONITOR FOR SAFTEY Q15 MINUTES .

## 2020-02-24 NOTE — NUR
RN NOTE: AT 1815 PATIENT AGITATED ,RESPONDING TO INTERNAL STIMULI ,HITTING HERSELF IN HER 
ARM AND LEGS MEDICATED WITH ATIVAN 1MG PO  AT 1815 .AT 19:04 PATIENT CALM AND QUIET WILL 
CONTINUE TO MONITOR.

## 2020-02-24 NOTE — NUR
rn gps notes

patient noted hitting bed states she hears voices, appears anxious ativan prn offered 
patient agreed, prn ativan given as ordered, will continue to monitor for effectiveness.

## 2020-02-25 VITALS — SYSTOLIC BLOOD PRESSURE: 116 MMHG | DIASTOLIC BLOOD PRESSURE: 78 MMHG

## 2020-02-25 VITALS — SYSTOLIC BLOOD PRESSURE: 110 MMHG | DIASTOLIC BLOOD PRESSURE: 71 MMHG

## 2020-02-25 VITALS — SYSTOLIC BLOOD PRESSURE: 101 MMHG | DIASTOLIC BLOOD PRESSURE: 69 MMHG

## 2020-02-25 RX ADMIN — Medication SCH MG: at 08:14

## 2020-02-25 RX ADMIN — OLANZAPINE SCH MG: 5 TABLET, FILM COATED ORAL at 21:48

## 2020-02-25 RX ADMIN — LISINOPRIL SCH MG: 10 TABLET ORAL at 08:12

## 2020-02-25 RX ADMIN — POLYETHYLENE GLYCOL 3350 SCH GM: 17 POWDER, FOR SOLUTION ORAL at 21:52

## 2020-02-25 RX ADMIN — MAGNESIUM HYDROXIDE PRN ML: 400 SUSPENSION ORAL at 10:27

## 2020-02-25 RX ADMIN — CALCIUM CARBONATE-CHOLECALCIFEROL TAB 250 MG-125 UNIT SCH UDTAB: 250-125 TAB at 08:11

## 2020-02-25 RX ADMIN — GABAPENTIN SCH MG: 300 CAPSULE ORAL at 12:38

## 2020-02-25 RX ADMIN — VENLAFAXINE HYDROCHLORIDE SCH MG: 75 CAPSULE, EXTENDED RELEASE ORAL at 08:11

## 2020-02-25 RX ADMIN — LORAZEPAM PRN MG: 1 TABLET ORAL at 22:41

## 2020-02-25 RX ADMIN — LORAZEPAM PRN MG: 1 TABLET ORAL at 12:36

## 2020-02-25 RX ADMIN — GABAPENTIN SCH MG: 300 CAPSULE ORAL at 16:50

## 2020-02-25 RX ADMIN — GABAPENTIN SCH MG: 300 CAPSULE ORAL at 08:11

## 2020-02-25 RX ADMIN — LORAZEPAM PRN MG: 1 TABLET ORAL at 06:39

## 2020-02-25 RX ADMIN — OLANZAPINE SCH MG: 5 TABLET, FILM COATED ORAL at 08:11

## 2020-02-25 RX ADMIN — CALCIUM CARBONATE-CHOLECALCIFEROL TAB 250 MG-125 UNIT SCH UDTAB: 250-125 TAB at 16:50

## 2020-02-25 RX ADMIN — OLANZAPINE SCH MG: 5 TABLET, FILM COATED ORAL at 12:38

## 2020-02-25 NOTE — NUR
Group Note: Pt attended group on 2/25/20 at 2pm discussing the topic of concerns around 
discharge plan. 



S: "I was told that I am going to be discharged to a nursing facility but I feel like I am 
too young for that and I do not know why this is the plan." 

"I know that my family is concerned for my safety and I am concerned about my health at this 
moment too but I just do not know where I can go. I want to go home but now my brother wants 
me to move to [place] to be around him."

O: Pt is alert and oriented x4. Pt appeared to be in a depressed mood but presented with a 
calm affect. Pt was able to maintain appropriate eye contact and tone of voice. 

A: Pt appeared to be confused regarding her discharge plan due to her age and her lack of 
insight towards her needs. Pt came to the conclusion at the end of the group that the MD has 
appropriately conducted this plan of care for her. SW explained the benefits of a nursing 
home and the pt stated that she understands that she will continue to work on her 
stabilization.

P: SW will continue to assess pts ability to participate in group milieu.

## 2020-02-25 NOTE — NUR
SNF Contact: Aria ([phone]) from [facility] contacted the SW and stated 
that their facility is at capacity at the moment so there is no bed for the pt.

## 2020-02-25 NOTE — NUR
GPS RN NOTE

PT WOKE UP DISORGANIZED, ANXIOUS, BANGING ON THE SIDE OF HER BED. ATIVAN 1MG 1TAB GIVEN PO 
AT 0640. WILL CONTINUE TO MONITOR AND ENDORSE TO AM SHIFT.

## 2020-02-25 NOTE — NUR
SNF Referral: SW faxed SNF referrals to the following facilities:



[facility] with attn to Sandra to the fax number: [phone]

[facility] with attn to Lucia to the fax number: [phone].

## 2020-02-25 NOTE — NUR
SNF Contact: Lucia ([phone]) from [facility] contacted the SW and stated that 
they are requesting additional notes for the pt due to her suicidal ideation.

## 2020-02-25 NOTE — NUR
RN NOTE/AGITATION: PT WITH INCREASED AGITATION. +CAH TO HURT SELF. PT HITTING THIGH WITH 
HAND. MEDICATED WITH ATIVAN 1MG PO PRN

## 2020-02-26 VITALS — DIASTOLIC BLOOD PRESSURE: 60 MMHG | SYSTOLIC BLOOD PRESSURE: 125 MMHG

## 2020-02-26 VITALS — SYSTOLIC BLOOD PRESSURE: 110 MMHG | DIASTOLIC BLOOD PRESSURE: 68 MMHG

## 2020-02-26 RX ADMIN — VENLAFAXINE HYDROCHLORIDE SCH MG: 75 CAPSULE, EXTENDED RELEASE ORAL at 08:22

## 2020-02-26 RX ADMIN — CALCIUM CARBONATE-CHOLECALCIFEROL TAB 250 MG-125 UNIT SCH UDTAB: 250-125 TAB at 16:17

## 2020-02-26 RX ADMIN — OLANZAPINE SCH MG: 5 TABLET, FILM COATED ORAL at 13:05

## 2020-02-26 RX ADMIN — POLYETHYLENE GLYCOL 3350 SCH GM: 17 POWDER, FOR SOLUTION ORAL at 21:03

## 2020-02-26 RX ADMIN — LISINOPRIL SCH MG: 10 TABLET ORAL at 08:22

## 2020-02-26 RX ADMIN — GABAPENTIN SCH MG: 300 CAPSULE ORAL at 16:16

## 2020-02-26 RX ADMIN — Medication SCH MG: at 08:22

## 2020-02-26 RX ADMIN — GABAPENTIN SCH MG: 300 CAPSULE ORAL at 12:50

## 2020-02-26 RX ADMIN — GABAPENTIN SCH MG: 300 CAPSULE ORAL at 08:21

## 2020-02-26 RX ADMIN — CALCIUM CARBONATE-CHOLECALCIFEROL TAB 250 MG-125 UNIT SCH UDTAB: 250-125 TAB at 08:22

## 2020-02-26 RX ADMIN — OLANZAPINE SCH MG: 5 TABLET, FILM COATED ORAL at 21:02

## 2020-02-26 RX ADMIN — OLANZAPINE SCH MG: 5 TABLET, FILM COATED ORAL at 08:21

## 2020-02-26 RX ADMIN — MAGNESIUM HYDROXIDE PRN ML: 400 SUSPENSION ORAL at 12:51

## 2020-02-26 RX ADMIN — LORAZEPAM PRN MG: 1 TABLET ORAL at 06:30

## 2020-02-26 NOTE — NUR
GROUP NOTE: Pt was present in group on this day discussing "discharge plan." 



S: "I do not want to share with you why I am here and I don't know where I am going." 



O: Pt is guarded and paranoid with depressed mood and flat affect. 



A: Pt has not gained awareness during her hospitalization. 



P: SW will continue to encourage pt to attend group milieu.

## 2020-02-26 NOTE — NUR
SNF Referral: AAMIR faxed additional notes to [facility] with attn to Lucia to the fax 
number: [phone].

## 2020-02-26 NOTE — NUR
GPS RN NOTE:

PT. IN BED,TALKING TO SELF  COMPLIANT WITH MEDICATIONS, NO ACUTE DISTRESS OR PAIN, ISOLATIVE 
AND WITHDRAWN. DENIES SI/HI NO C/O PAIN OR ANY DISCOMFORT PT WILL CONTINUE TO MONITOR FOR 
SAFETY AND BEHAVIOR.

## 2020-02-27 VITALS — DIASTOLIC BLOOD PRESSURE: 72 MMHG | SYSTOLIC BLOOD PRESSURE: 129 MMHG

## 2020-02-27 VITALS — SYSTOLIC BLOOD PRESSURE: 100 MMHG | DIASTOLIC BLOOD PRESSURE: 69 MMHG

## 2020-02-27 VITALS — SYSTOLIC BLOOD PRESSURE: 109 MMHG | DIASTOLIC BLOOD PRESSURE: 68 MMHG

## 2020-02-27 RX ADMIN — GABAPENTIN SCH MG: 300 CAPSULE ORAL at 08:25

## 2020-02-27 RX ADMIN — LORAZEPAM PRN MG: 1 TABLET ORAL at 10:20

## 2020-02-27 RX ADMIN — CALCIUM CARBONATE-CHOLECALCIFEROL TAB 250 MG-125 UNIT SCH UDTAB: 250-125 TAB at 16:16

## 2020-02-27 RX ADMIN — CALCIUM CARBONATE-CHOLECALCIFEROL TAB 250 MG-125 UNIT SCH UDTAB: 250-125 TAB at 08:23

## 2020-02-27 RX ADMIN — GABAPENTIN SCH MG: 300 CAPSULE ORAL at 16:16

## 2020-02-27 RX ADMIN — GABAPENTIN SCH MG: 300 CAPSULE ORAL at 12:07

## 2020-02-27 RX ADMIN — LISINOPRIL SCH MG: 10 TABLET ORAL at 08:24

## 2020-02-27 RX ADMIN — OLANZAPINE SCH MG: 5 TABLET, FILM COATED ORAL at 08:25

## 2020-02-27 RX ADMIN — POLYETHYLENE GLYCOL 3350 SCH GM: 17 POWDER, FOR SOLUTION ORAL at 21:37

## 2020-02-27 RX ADMIN — MAGNESIUM HYDROXIDE PRN ML: 400 SUSPENSION ORAL at 08:53

## 2020-02-27 RX ADMIN — Medication SCH MG: at 08:26

## 2020-02-27 RX ADMIN — VENLAFAXINE HYDROCHLORIDE SCH MG: 75 CAPSULE, EXTENDED RELEASE ORAL at 08:23

## 2020-02-27 RX ADMIN — OLANZAPINE SCH MG: 5 TABLET, FILM COATED ORAL at 12:07

## 2020-02-27 RX ADMIN — OLANZAPINE SCH MG: 5 TABLET, FILM COATED ORAL at 21:01

## 2020-02-27 RX ADMIN — LORAZEPAM PRN MG: 1 TABLET ORAL at 03:51

## 2020-02-27 NOTE — NUR
RN NOTE:

RECEIVED PT LYING IN BED. NO ACUTE DISTRESS NOTED. VSS, AFEBRILE. PT 

A+OX3. ABLE TO MAKE NEEDS KNOWN. + COMMAND AUDITORY HALLUCINATIONS TO HURT 

SELF. PT DENIES SI/HI AT PRESENT TIME. PT C/O CONSTIPATION AND MEDICATED 

WITH MILK OF MAGNESIA AND GIVEN PRUNE JUICE. BP MEDICATGION HELD DUE TO 

DECREASED BLOOD PRESSURE. PT COMPLIANT WITH MEDICATION ADMISTRATION AND 

PLAN OF CARE. WILL CONT TO MONITOR PT PER [facility] PROTOCOL

## 2020-02-27 NOTE — NUR
RN NOTE: DR. SCHAEFER AT BEDSIDE. PLAN OF CARE DISCUSSED WITH PT. POSSIBLE DISCHARGE 2/28/2020 
PENDING LAB RESULTS. PT'S LEGAL STATUS CHANGED TO VOLUNTARY. VOLUNTARY FORM SIGNED AND PT 
VERBALIZE UNDERSTANDING REGARDING PLAN OF CARE AND LEGAL STATUS.

## 2020-02-27 NOTE — NUR
Family Contact: AAMIR called the pts brother, Anatoliy ([phone]), and left a voicemail 
stating that placement in a nursing facility was secured and that the pt will be discharged 
the following day. AAMIR stated that she can provide more information with a phone call.

## 2020-02-27 NOTE — NUR
SNF Contact: Lucia ([phone]) from [facility] contacted the SW and stated that 
they will accept the pt. SW informed them that the pt is going to be discharged tomorrow.

## 2020-02-27 NOTE — NUR
Family Contact: Pts brother, Anatoliy ([phone]), called the SW and stated that he 
accepts the placement and the SW provided him with the details of the facility.

## 2020-02-27 NOTE — NUR
RN NOTE: PT REPORTS IMPROVEMENT WITH ANXIETY AND AUDITORY HALLUCINATIONS AFTER 
ADMINISTRATION OF ATIVAN. WILL CONT TO MONITOR PT'S STATUS

## 2020-02-27 NOTE — NUR
Group Note: SW encouraged pt to attend group therapy on 2/27/20 at 2pm discussing social 
supports. Pt appeared to be asleep in her bed and stated that she did not want to 
participate in group and that she is being discharged the following morning. SW encouraged 
her to participate as it is still part of her treatment plan goals and informed her that she 
could talk about her brother but the pt refused.

## 2020-02-27 NOTE — NUR
RN NOTE: PT C/O INCREASED ANXIETY AND AUDITORY HALLUCINATIONS. PT MEDICATED WITH ATIVAN 1MG 
PO PRN.

## 2020-02-27 NOTE — NUR
GPS RN NOTES: C/O OF FEELING ANXIOUS 

UPON DOING ROUNDS, PT IS CRYING AND YELLING IN HER ROOM. PT C/O OF FEELING ANXIOUS. OFFERED 
ATIVAN 1MG PO PRN AS ORDERED. PT AGREED AND TOLERATED MEDICATION WELL. CONTINUE TO MONITOR.

## 2020-02-28 VITALS — SYSTOLIC BLOOD PRESSURE: 117 MMHG | DIASTOLIC BLOOD PRESSURE: 71 MMHG

## 2020-02-28 VITALS — DIASTOLIC BLOOD PRESSURE: 71 MMHG | SYSTOLIC BLOOD PRESSURE: 117 MMHG

## 2020-02-28 RX ADMIN — Medication SCH MG: at 08:29

## 2020-02-28 RX ADMIN — OLANZAPINE SCH MG: 5 TABLET, FILM COATED ORAL at 08:29

## 2020-02-28 RX ADMIN — CALCIUM CARBONATE-CHOLECALCIFEROL TAB 250 MG-125 UNIT SCH UDTAB: 250-125 TAB at 08:29

## 2020-02-28 RX ADMIN — VENLAFAXINE HYDROCHLORIDE SCH MG: 75 CAPSULE, EXTENDED RELEASE ORAL at 08:29

## 2020-02-28 RX ADMIN — GABAPENTIN SCH MG: 300 CAPSULE ORAL at 08:29

## 2020-02-28 RX ADMIN — LISINOPRIL SCH MG: 10 TABLET ORAL at 08:30

## 2020-02-28 RX ADMIN — LORAZEPAM PRN MG: 1 TABLET ORAL at 00:31

## 2020-02-28 NOTE — NUR
GPS RN OPENING NOTE:

RECEIVED PT LYING IN BED. NO ACUTE DISTRESS NOTED. VSS, PT AOX3. ABLE TO MAKE NEEDS KNOWN. + 
COMMAND AUDITORY HALLUCINATIONS. PT HITTING SIDE RAILS OF BED DUE TO AUDITORY 
HALLUCINATIONS. PATIENT FOUND CRYING IN BED THIS MORNING. PT DENIES SI/HI AT PRESENT TIME. 
BP MEDICATION HELD DUE TO DECREASED BLOOD PRESSURE. PT COMPLIANT WITH MEDICATION 
ADMINISTRATION AND PLAN OF CARE. DISCHARGE PLANNED FOR TODAY. WILL CONT TO MONITOR PT PER 
[facility] PROTOCOL

## 2020-02-28 NOTE — NUR
GPS RN NOTES: C/O OF FEELING ANXIOUS 

UPON DOING ROUNDS, PT IS CRYING AND YELLING IN HER ROOM. PT TAPPING ON HER BED WITH HER 
HANDS. PT C/O OF FEELING ANXIOUS. OFFERED ATIVAN 1MG PO PRN AS ORDERED. PT AGREED AND 
TOLERATED MEDICATION WELL. CONTINUE TO MONITOR.

## 2020-02-28 NOTE — NUR
Discharge Note: Pt was discharged to [facility] SNF located at [address] [place] [address]; [phone]. Pt was transported via [facility] at 11AM. 
Pts brother, Anatoliy ([phone]), was made aware. Upon discharge, the pt appeared to be 
in a euthymic mood and presented with a distressed affect. Pt presented as alert and 
oriented x4 (time, place, self and situation). Pt denied both suicidal and homicidal 
ideation as well as auditory and visual hallucinations. Pt will continue to be under the 
care of psychiatrist, Dr. Khan, located at [address] [address] [facility] [address], [place] [address]; 
[phone] and internist, Dr. Mari, located at [address] [place], [place] 
[address]; [phone]. Pts Choice of Vendor was signed.

## 2020-07-11 ENCOUNTER — HOSPITAL ENCOUNTER (INPATIENT)
Dept: HOSPITAL 54 - ER | Age: 55
LOS: 4 days | Discharge: PSYCHIATRIC OR SUBSTANCE USE FACILITY | DRG: 645 | End: 2020-07-15
Attending: NURSE PRACTITIONER | Admitting: NURSE PRACTITIONER
Payer: MEDICARE

## 2020-07-11 VITALS — SYSTOLIC BLOOD PRESSURE: 126 MMHG | DIASTOLIC BLOOD PRESSURE: 76 MMHG

## 2020-07-11 VITALS — HEIGHT: 66 IN | BODY MASS INDEX: 20.25 KG/M2 | WEIGHT: 126 LBS

## 2020-07-11 DIAGNOSIS — E88.09: ICD-10-CM

## 2020-07-11 DIAGNOSIS — E22.1: Primary | ICD-10-CM

## 2020-07-11 DIAGNOSIS — F25.0: ICD-10-CM

## 2020-07-11 DIAGNOSIS — F41.9: ICD-10-CM

## 2020-07-11 DIAGNOSIS — F29: ICD-10-CM

## 2020-07-11 DIAGNOSIS — Z73.6: ICD-10-CM

## 2020-07-11 DIAGNOSIS — F32.9: ICD-10-CM

## 2020-07-11 DIAGNOSIS — I10: ICD-10-CM

## 2020-07-11 LAB
ALBUMIN SERPL BCP-MCNC: 3.8 G/DL (ref 3.4–5)
ALP SERPL-CCNC: 94 U/L (ref 46–116)
ALT SERPL W P-5'-P-CCNC: 32 U/L (ref 12–78)
APAP SERPL-MCNC: 0 UG/ML (ref 10–30)
AST SERPL W P-5'-P-CCNC: 15 U/L (ref 15–37)
BASOPHILS # BLD AUTO: 0 /CMM (ref 0–0.2)
BASOPHILS NFR BLD AUTO: 0.5 % (ref 0–2)
BILIRUB DIRECT SERPL-MCNC: 0.1 MG/DL (ref 0–0.2)
BILIRUB SERPL-MCNC: 0.3 MG/DL (ref 0.2–1)
BUN SERPL-MCNC: 15 MG/DL (ref 7–18)
CALCIUM SERPL-MCNC: 8.7 MG/DL (ref 8.5–10.1)
CHLORIDE SERPL-SCNC: 103 MMOL/L (ref 98–107)
CO2 SERPL-SCNC: 27 MMOL/L (ref 21–32)
CREAT SERPL-MCNC: 1.1 MG/DL (ref 0.6–1.3)
EOSINOPHIL NFR BLD AUTO: 1.6 % (ref 0–6)
ETHANOL SERPL-MCNC: < 3 MG/DL (ref 0–0)
GLUCOSE SERPL-MCNC: 120 MG/DL (ref 74–106)
HCT VFR BLD AUTO: 44 % (ref 33–45)
HGB BLD-MCNC: 14.8 G/DL (ref 11.5–14.8)
LYMPHOCYTES NFR BLD AUTO: 1.7 /CMM (ref 0.8–4.8)
LYMPHOCYTES NFR BLD AUTO: 21 % (ref 20–44)
MCHC RBC AUTO-ENTMCNC: 34 G/DL (ref 31–36)
MCV RBC AUTO: 92 FL (ref 82–100)
MONOCYTES NFR BLD AUTO: 0.8 /CMM (ref 0.1–1.3)
MONOCYTES NFR BLD AUTO: 9.7 % (ref 2–12)
NEUTROPHILS # BLD AUTO: 5.5 /CMM (ref 1.8–8.9)
NEUTROPHILS NFR BLD AUTO: 67.2 % (ref 43–81)
PH UR STRIP: 6 [PH] (ref 5–8)
PLATELET # BLD AUTO: 260 /CMM (ref 150–450)
POTASSIUM SERPL-SCNC: 3.9 MMOL/L (ref 3.5–5.1)
PROT SERPL-MCNC: 7.3 G/DL (ref 6.4–8.2)
RBC # BLD AUTO: 4.78 MIL/UL (ref 4–5.2)
RBC #/AREA URNS HPF: (no result) /HPF (ref 0–2)
SALICYLATES SERPL-MCNC: < 2.8 MG/DL (ref 2.8–20)
SODIUM SERPL-SCNC: 139 MMOL/L (ref 136–145)
UROBILINOGEN UR STRIP-MCNC: 0.2 EU/DL
WBC #/AREA URNS HPF: (no result) /HPF (ref 0–3)
WBC NRBC COR # BLD AUTO: 8.3 K/UL (ref 4.3–11)

## 2020-07-11 PROCEDURE — G0378 HOSPITAL OBSERVATION PER HR: HCPCS

## 2020-07-11 PROCEDURE — G0480 DRUG TEST DEF 1-7 CLASSES: HCPCS

## 2020-07-11 NOTE — NUR
RN OPENING NOTES:



Received pt in bed, awake A&Ox4. On RA, tolerating well. No SOB or respiratory distress 
noted. On isolation precautions for R/O Covid. Pt currently on 5150 hold. Pt ambulatory with 
steady gait. No IV access. Pt cleaned, skin check done, head to toe assessment done. Siva Pearson DNP aware of pt's arrival in unit. Will put orders in. Safety measures in place. Will 
continue to monitor.

## 2020-07-11 NOTE — NUR
-------------------------------------------------------------------------------

            *** Note undone in [facility] - 07/11/20 at 1444 by MATILDA ***            

-------------------------------------------------------------------------------

[facility] CALLED,SPOKE WITH ART RN

## 2020-07-11 NOTE — NUR
RECEIVED PATIENT FROM ER PER KIMBERLY MCMANUS PATIENT WILL BE MEDSURG ONLY AND HE WILL PLACE 
ADMITTING ORDERS,WILL CONTINUE TO FF.

## 2020-07-11 NOTE — NUR
Patient:   STEFANO SMITH            MRN: [id number]            FIN: [id number]              Age:   89 years     Sex:  FEMALE     :  28   Associated Diagnoses:   None   Author:   KRISTIN FARMER     Subjective:  Overnight events reviewed,   Continues to slowly improve  On 3 L nasal cannula  Ambulating today  Objective:  I & O between:  2020 13:42 TO 2020 13:42  Med Dosing Weight:  63.5  kg   2020  24 Hour Intake:   330.00  ( 5.20 mL/kg )  24 Hour Output:   650.00           24 Hour Urine/Stool Output:   0.0  24 Hour Balance:   -320.00           24 Hour Urine Output:   650.00  ( 0.43 mL/kg/hr )                   Urine Count:  1     Vitals between:   2020 13:42:12   TO   2020 13:42:12                   LAST RESULT MINIMUM MAXIMUM  Temperature 36.8 36.8 36.8  Heart Rate 65 65 73  Respiratory Rate 16 16 18  NISBP           148 148 205  NIDBP           63 62 74  NIMBP           91 91 116  SpO2                    99 96 100  FiO2                    0.3 0.3 0.3  Medications (21) Active  Scheduled: (18)  Allopurinol 100 mg tab  100 mg 1 tab, Oral, Q Other Day  ALPRAZolam 0.25 mg tab  0.25 mg 1 tab, Oral, Q Evening  ApixaBAN 5 mg tab  5 mg 1 tab, Oral, Q12H  Aspirin 81 mg chew tab  81 mg 1 tab, Oral, Daily  Atorvastatin 40 mg tab  40 mg 1 tab, Oral, Daily  Cetirizine 10 mg tab  10 mg 1 tab, Oral, Daily  Cholecalciferol 1,000 unit (25 mcg) tab  2,000 unit 2 tab, Oral, Daily  DiSOPyramide 100 mg cap  100 mg 1 cap, Oral, Q12H  Ferrous sulfate 325 mg tab [Fe 65 mg]  325 mg 1 tab, Oral, BID  Fluticasone 50 mcg nasal spray 16 gm  50 mcg 1 spray, Each Nostril, Daily  Fluticasone furoate 100 mcg inhalation powder 14s  1 puff, MDI/DPI, Q Bedtime  Furosemide 20 mg tab  20 mg 1 tab, Oral, Daily  HydrALAZINE 25 mg tab  25 mg 1 tab, Oral, Q8H  Levothyroxine 75 mcg tab  75 mcg 1 tab, Oral, QAM at 6  Metoprolol tartrate 100 mg tab  100 mg 1 tab, Oral, Q12H  Polyethylene glycol 3350 oral  REPORT GIVEN TO LADONNA TOMPKINS FOR VIOLA. recon powder 17 gm packet UD  17 gm 1 packet, Oral, Daily  Spironolactone 25 mg tab  12.5 mg 0.5 tab, Oral, Daily  Verapamil 240 mg SR tab  240 mg 1 tab, Oral, Daily  Continuous: (0)  PRN: (3)  Acetaminophen 325 mg tab  650 mg 2 tab, Oral, Q6H  Albuterol HFA 90 mcg oral MDI 8 gm  180 mcg 2 puff, MDI/DPI, Q6H  Docusate sodium 100 mg cap  100 mg 1 cap, Oral, BID  GENERAL:  in no apparent distress.  CHEST:  Chest with clear breath sounds decreased bibasilar areas  CARDIAC:  Regular rate and rhythm.  S1 and S2, without murmurs, gallops, or rubs.  VASCULAR:  No lower extremity edema.  Left upper extremity edema improving  ABDOMEN:  Soft, without detectable tenderness.  No sign of distention.  No   rebound or guarding, and no masses palpated.   Bowel Sounds normal.  MUSCULOSKELETAL:  Good range of motion of all major joints. Extremities without clubbing, cyanosis or edema.  PSYCH: Normal moood and affect.  Labs:  Labs between:  20-FEB-2020 13:42 to 21-FEB-2020 13:42  BMP:                 Na  Cl  BUN  Glu   21-FEB-2020 143  102  18  69                              K  CO2  Cr  Ca                              3.6  (H) 37  0.95  9.0                  CT chest   There are no pulmonary emboli.  Lungs and large airways: Bibasilar compressive atelectasis.  Mediastinum and lee: No enlarged mediastinal or hilar lymph nodes.  Pleura: Large bilateral pleural effusions.  Heart and great vessels: Heart size is normal.  Small pericardial effusion.  Cardiac defibrillator/ pacer with pulse generator in the left chest wall.  Aortic valvular calcification.  Mitral annular calcification.  Three-vessel coronary artery atherosclerotic calcification.  Chest wall, lower neck, axillae: No enlarged supraclavicular or axillary lymph nodes.  Upper abdomen: Multiple simple cysts in the liver.  Limited views the upper abdomen are otherwise unremarkable.  Bones: No lytic or blastic osseous lesions.  No acute fractures.      LUextremity  Dopplers  Negative for acute DVT  2D echo  1. Left ventricle: The cavity size is normal. Wall thickness is increased.    There is concentric hypertrophy. Systolic function is normal. The    estimated ejection fraction is 70-75%, by single plane method of disks.  2. Aortic valve: Transvalvular velocity is increased. There is mild    stenosis. Moderate regurgitation.  3. Mitral valve: The annulus is markedly calcified. The leaflets are    mildly calcified. Transvalvular velocity is increased. The findings are    consistent with moderate stenosis. The valve area index by pressure    half-time is 1.39cm\S\2/m\S\2.  4. Left atrium: The atrium is severely dilated.  5. Right ventricle: Pacemaker lead noted in right ventricle. Systolic    pressure is moderately increased. The estimated peak pressure is 55mm    Hg.  6. Right atrium: Pacemaker lead noted in right atrium.  7. Pericardium, extracardiac: There is a right pleural effusion and a    large left pleural effusion.  Chest x-ray 2/19/2020 reviewed–persistent stable bilateral effusions  Impression and Plan   Patient is 89-year-old female with past medical history of CHF, COPD on 2 L home O2, CKD, hypertension, hyperlipidemia, obesity, TRISTIAN on CPAP who presented with worsening shortness of breath.  #Dyspnea on exertion  #CHF with exacerbation  #COPD without acute exacerbation  #Abnormal imaging  #Pleural effusions  #CKD  #HTN/hyperlipidemia  #TRISTIAN  #hypothyroidism  Plan:  Dyspnea on exertion  -Fluid overload improving  -Bilateral effusions  -WBC count negative  -BNP elevated supporting diagnosis of worsening heart failure  -Cardiology following, Appreciate recommendations  -Nightly BiPAP  -Strict ins and outs  Bilateral pleural effusions  -No need for thoracentesis at this time  -Maximize diuresis  -X-ray As above  COPD  -I do not believe that patient has acute COPD exacerbation  -No systemic steroids needed at this time  -Continue bronchodilators every 6 hours  A.  fib  -Controlled currently  -Continue medical management per cardiology  -On anticoagulation  CKD  -Creatinine appropriate  -Check labs today  -Monitor with diuresis  TRISTIAN  -Continue CPAP/BiPAP nightly  -Avoid oversedation  Hypo-thyroidism  -Continue Synthroid  Prophylaxis  Crease activity  PT/OT  Discussed with family, RN  Okay to DC from pulmonary standpoint  Follow-up in 3 to 4 weeks  Patricio Rodriguez MD  Pulmonary and critical care medicine  [facility], Critical Care and sleep physicians

## 2020-07-11 NOTE — NUR
RN NOTE



RECEIVED PATIENT FROM ER. PATIENT ADMITTED AS MS STATUS. IN STABLE CONDITION, ADMITTED DUE 
TO PENDING COVID SWAB. ON 5150 HOLD. SITTER OBSERVED AT BEDSIDE. PATIENT IS COOPERATIVE. 
VITAL SIGNS ARE STABLE AS FOLLOW: BP: 126/78, P: 88, R: 16, O2: 96%, T: 97.6. PATIENT 
SATURATING WELL ON ROOM AIR, NO SIGNS OF RESPIRATORY DISTRESS NOTED. AMBULATORY, ABLE TO 
MAKE NEEDS KNOWN. LABS ARE NORMAL. PATIENT HAS NO COMPLAINS AT THE MOMENT. SAFETY 
MAINTAINED, CALL LIGHT WITHIN REACH, WILL ENDORSED TO PM NURSE FOR ADMISSION.

## 2020-07-11 NOTE — NUR
PT BIBPA FROM SNF C/O AGGRESSIVE BEHAVIOR TOWARDS STAFF. PT IS AAOX3, NOT IN 
RESPIRATORY DISTRESS, HOOKED TO CARDIAC MONITOR, KEPT RESTED AND COMFORTABLE. 
WILL CONTINUE TO MONITOR.

## 2020-07-12 VITALS — DIASTOLIC BLOOD PRESSURE: 67 MMHG | SYSTOLIC BLOOD PRESSURE: 119 MMHG

## 2020-07-12 VITALS — DIASTOLIC BLOOD PRESSURE: 56 MMHG | SYSTOLIC BLOOD PRESSURE: 96 MMHG

## 2020-07-12 VITALS — DIASTOLIC BLOOD PRESSURE: 69 MMHG | SYSTOLIC BLOOD PRESSURE: 112 MMHG

## 2020-07-12 VITALS — SYSTOLIC BLOOD PRESSURE: 120 MMHG | DIASTOLIC BLOOD PRESSURE: 65 MMHG

## 2020-07-12 LAB
BASOPHILS # BLD AUTO: 0 /CMM (ref 0–0.2)
BASOPHILS NFR BLD AUTO: 0.5 % (ref 0–2)
BUN SERPL-MCNC: 14 MG/DL (ref 7–18)
CALCIUM SERPL-MCNC: 8.8 MG/DL (ref 8.5–10.1)
CHLORIDE SERPL-SCNC: 102 MMOL/L (ref 98–107)
CHOLEST SERPL-MCNC: 196 MG/DL (ref ?–200)
CO2 SERPL-SCNC: 26 MMOL/L (ref 21–32)
CREAT SERPL-MCNC: 0.8 MG/DL (ref 0.6–1.3)
EOSINOPHIL NFR BLD AUTO: 2.3 % (ref 0–6)
GLUCOSE SERPL-MCNC: 105 MG/DL (ref 74–106)
HCT VFR BLD AUTO: 44 % (ref 33–45)
HDLC SERPL-MCNC: 36 MG/DL (ref 40–60)
HGB BLD-MCNC: 14.6 G/DL (ref 11.5–14.8)
LDLC SERPL DIRECT ASSAY-MCNC: 154 MG/DL (ref 0–99)
LYMPHOCYTES NFR BLD AUTO: 1.4 /CMM (ref 0.8–4.8)
LYMPHOCYTES NFR BLD AUTO: 19.1 % (ref 20–44)
MAGNESIUM SERPL-MCNC: 2 MG/DL (ref 1.8–2.4)
MCHC RBC AUTO-ENTMCNC: 33 G/DL (ref 31–36)
MCV RBC AUTO: 92 FL (ref 82–100)
MONOCYTES NFR BLD AUTO: 0.8 /CMM (ref 0.1–1.3)
MONOCYTES NFR BLD AUTO: 10.9 % (ref 2–12)
NEUTROPHILS # BLD AUTO: 5 /CMM (ref 1.8–8.9)
NEUTROPHILS NFR BLD AUTO: 67.2 % (ref 43–81)
PHOSPHATE SERPL-MCNC: 4.3 MG/DL (ref 2.5–4.9)
PLATELET # BLD AUTO: 245 /CMM (ref 150–450)
POTASSIUM SERPL-SCNC: 3.7 MMOL/L (ref 3.5–5.1)
RBC # BLD AUTO: 4.79 MIL/UL (ref 4–5.2)
SODIUM SERPL-SCNC: 138 MMOL/L (ref 136–145)
TRIGL SERPL-MCNC: 142 MG/DL (ref 30–150)
WBC NRBC COR # BLD AUTO: 7.4 K/UL (ref 4.3–11)

## 2020-07-12 RX ADMIN — VENLAFAXINE HYDROCHLORIDE SCH MG: 150 CAPSULE, EXTENDED RELEASE ORAL at 14:37

## 2020-07-12 RX ADMIN — CALCIUM CARBONATE-CHOLECALCIFEROL TAB 250 MG-125 UNIT SCH UDTAB: 250-125 TAB at 08:16

## 2020-07-12 RX ADMIN — OLANZAPINE SCH MG: 10 TABLET ORAL at 21:00

## 2020-07-12 RX ADMIN — GABAPENTIN SCH MG: 300 CAPSULE ORAL at 08:17

## 2020-07-12 RX ADMIN — GABAPENTIN SCH MG: 300 CAPSULE ORAL at 12:07

## 2020-07-12 RX ADMIN — LISINOPRIL SCH MG: 10 TABLET ORAL at 08:17

## 2020-07-12 RX ADMIN — GABAPENTIN SCH MG: 300 CAPSULE ORAL at 16:55

## 2020-07-12 RX ADMIN — CALCIUM CARBONATE-CHOLECALCIFEROL TAB 250 MG-125 UNIT SCH UDTAB: 250-125 TAB at 16:55

## 2020-07-12 NOTE — NUR
RN CLOSING NOTES:



Pt remains on isolation precautions for Covid. Remains on RA. No SOB or respiratory distress 
noted. No acute changes noted during shift. Safety measures in place. Will endorse to AM 
nurse for VIOLA.

## 2020-07-12 NOTE — NUR
tele rn note 

 called to dr cummings notified that patient become very agitated and yelling out  and become 
abusive self as clapping her body with new order Zyprexa and Ativan given, will f\u

## 2020-07-12 NOTE — NUR
MS RN NOTE 

PATIENT IN BED, ALL NEEDS ATTENDED, ON RA ,NO SOB NOTED ,SAT 97%, HAVING BREAKFAST , ABLE TO 
EAT SELF, BED IN LOWEST AND LOCKED POSITION , OH HOLD FOR 72 HOUR AS ORDERED, SITTER AT 
BEDSIDE,RESTING COMFORTABLY AT THIS TIME ,NO AGITATION NOTED

## 2020-07-12 NOTE — NUR
ms rn note

 restring comfortably, all needs attended,no sob noted not in distress, still on hold 72 
hour per hospital protocol ,with sitter at bedside

## 2020-07-12 NOTE — NUR
RN OPENING



PT RECEIVED IN BED. PT IS A/A/O X3, NO DISTRESS NOTED, PT IS ON RA NO SOB NOTED, PT IS ON 
5150 HOLD, PT HAS  SITTER  , PT IS CALM AND COOPERATIVE. NO IV ACCESS. SAFETY MEASURES IN 
PLACE BED AT LOWEST POSITIO AND LOCKED, SIDE RAILS UP X2. WILL CONTINUE TO MONITOR.

## 2020-07-13 VITALS — SYSTOLIC BLOOD PRESSURE: 102 MMHG | DIASTOLIC BLOOD PRESSURE: 59 MMHG

## 2020-07-13 VITALS — DIASTOLIC BLOOD PRESSURE: 58 MMHG | SYSTOLIC BLOOD PRESSURE: 110 MMHG

## 2020-07-13 VITALS — DIASTOLIC BLOOD PRESSURE: 61 MMHG | SYSTOLIC BLOOD PRESSURE: 102 MMHG

## 2020-07-13 RX ADMIN — LISINOPRIL SCH MG: 10 TABLET ORAL at 08:47

## 2020-07-13 RX ADMIN — Medication SCH MG: at 13:48

## 2020-07-13 RX ADMIN — CALCIUM CARBONATE-CHOLECALCIFEROL TAB 250 MG-125 UNIT SCH UDTAB: 250-125 TAB at 08:46

## 2020-07-13 RX ADMIN — GABAPENTIN SCH MG: 300 CAPSULE ORAL at 16:04

## 2020-07-13 RX ADMIN — GABAPENTIN SCH MG: 300 CAPSULE ORAL at 12:16

## 2020-07-13 RX ADMIN — GABAPENTIN SCH MG: 300 CAPSULE ORAL at 08:46

## 2020-07-13 RX ADMIN — CALCIUM CARBONATE-CHOLECALCIFEROL TAB 250 MG-125 UNIT SCH UDTAB: 250-125 TAB at 16:04

## 2020-07-13 RX ADMIN — VENLAFAXINE HYDROCHLORIDE SCH MG: 150 CAPSULE, EXTENDED RELEASE ORAL at 08:46

## 2020-07-13 RX ADMIN — OLANZAPINE SCH MG: 10 TABLET ORAL at 08:46

## 2020-07-13 RX ADMIN — OLANZAPINE SCH MG: 10 TABLET ORAL at 20:38

## 2020-07-13 NOTE — NUR
RN CLOSING NOTES:



Pt remains on isolation precautions for Covid. Remains on RA. No SOB or respiratory distress 
noted. No acute changes noted during shift. Safety measures in place. Will endorse to pm 
nurse for VIOLA.

## 2020-07-13 NOTE — NUR
RN opening note



Received patient in bed AO x 2-3, able to responds all stimuli. Does no appears pain or any 
discomfort. Skin is warm to touch, kept clean/dry. Respiratory even and unlabored in room 
air, no distress observed. Keep bed in locked with low elevated HOB and low position of the 
bed. Call light within reach, will continue to monitor.

## 2020-07-13 NOTE — NUR
received report from Diann DOUGHERTY. patient in bed AO x 2-3, able to responds all stimuli. no s/s 
of distress. Skin is warm to touch, kept clean/dry. Respiratory even and unlabored in room 
air. Bed in locked with low elevated HOB and low position of the bed. Call light within 
reach. will continue to monitor

## 2020-07-13 NOTE — NUR
RN OPENING NOTES

RECEIVED PT LYING ON BED AWAKE NO SIGN AND SYMPTOMS OF RESPIRATORY DISTRESS ON RA SPO2 95 % 
NO COMPLAINTS OF PAIN AT THIS TIME, PATIENT IS COMPLAINT WITH MEDICATION AND TREATMENT, 
ALERT AND VERY COOPERATIVE, PATIENT DENIES ANY SUICIDE IDEATION AND HOMICIDAL IDEATION AT 
THIS TIME, SAFETY MEASURE MAINTAINED CALL LIGHT WITHIN REACH SIDE RFAILS UP X2 PT IS VISIBLE 
TO SITTER, ON DROPLET ISOLATION FOR R/O COVID WILL CONT TO MONITOR

## 2020-07-13 NOTE — NUR
RN CLOSING NOTE



PT REMAINED CALM AND COOPERATIVE DURING MY SHIFT. VSS, NO SOB NOTED. WILL ENDORSE TO 
INCOMING SHIFT FOR VIOLA. SDFETY MEASURES IN PLACE.

## 2020-07-14 VITALS — SYSTOLIC BLOOD PRESSURE: 113 MMHG | DIASTOLIC BLOOD PRESSURE: 67 MMHG

## 2020-07-14 VITALS — SYSTOLIC BLOOD PRESSURE: 111 MMHG | DIASTOLIC BLOOD PRESSURE: 71 MMHG

## 2020-07-14 VITALS — DIASTOLIC BLOOD PRESSURE: 71 MMHG | SYSTOLIC BLOOD PRESSURE: 111 MMHG

## 2020-07-14 VITALS — DIASTOLIC BLOOD PRESSURE: 76 MMHG | SYSTOLIC BLOOD PRESSURE: 127 MMHG

## 2020-07-14 VITALS — SYSTOLIC BLOOD PRESSURE: 120 MMHG | DIASTOLIC BLOOD PRESSURE: 75 MMHG

## 2020-07-14 VITALS — SYSTOLIC BLOOD PRESSURE: 125 MMHG | DIASTOLIC BLOOD PRESSURE: 74 MMHG

## 2020-07-14 RX ADMIN — GABAPENTIN SCH MG: 300 CAPSULE ORAL at 12:01

## 2020-07-14 RX ADMIN — Medication SCH MG: at 21:03

## 2020-07-14 RX ADMIN — OLANZAPINE SCH MG: 10 TABLET ORAL at 08:14

## 2020-07-14 RX ADMIN — CALCIUM CARBONATE-CHOLECALCIFEROL TAB 250 MG-125 UNIT SCH UDTAB: 250-125 TAB at 16:28

## 2020-07-14 RX ADMIN — GABAPENTIN SCH MG: 300 CAPSULE ORAL at 08:14

## 2020-07-14 RX ADMIN — VENLAFAXINE HYDROCHLORIDE SCH MG: 150 CAPSULE, EXTENDED RELEASE ORAL at 08:14

## 2020-07-14 RX ADMIN — GABAPENTIN SCH MG: 300 CAPSULE ORAL at 16:28

## 2020-07-14 RX ADMIN — LISINOPRIL SCH MG: 10 TABLET ORAL at 08:15

## 2020-07-14 RX ADMIN — CALCIUM CARBONATE-CHOLECALCIFEROL TAB 250 MG-125 UNIT SCH UDTAB: 250-125 TAB at 08:14

## 2020-07-14 RX ADMIN — OLANZAPINE SCH MG: 10 TABLET ORAL at 20:54

## 2020-07-14 NOTE — NUR
MS RN  NOTE 

 DR LANG PSYCHIATRIST NOTIFIED THAT HOLD ORDER WILL  TODAY AT 1730 ,STATED THAT WILL 
CONT HOLD  ORDER FOR 14  DAYS, WILL F\U formerly [facility] UNIT NOTIFIED

## 2020-07-14 NOTE — NUR
spoke with dr. evelia giron per md patient not clear yet today for discharge to 
GPS.NURSING SUP MADE AWARE.

## 2020-07-14 NOTE — NUR
RN OPENING NOTES

RECEIVED PT IN  BED AWAKE NO SIGN AND SYMPTOMS OF RESPIRATORY DISTRESS ON RA NO SOB NO 
COOPERATIVE,  SAFETY MEASURE MAINTAINED, CALL LIGHT WITHIN REACH SIDE RAILS UP X2 PT IS 
VISIBLE TO SITTER, ON DROPLET ISOLATION FOR R/O COVID ,WILL CONT TO MONITOR, HAVING 
BREAKFAST SELF

## 2020-07-14 NOTE — NUR
RN NOTES



RECEIVED PT FROM [facility] WITH A [facility] STATUS VIA WHEELCHAIR WITH A SITTER. PT AWAKE, A/O X3. PT 
TOLERATING RA WITH NO ACUTE RESPIRATORY DISTRESS NOTED. PT DENIES ANY PAIN OR DISCOMFORT AT 
THIS TIME. NO PIV NOTED. VITALS CHECKED AND RECORDED. PT KEPT COMFORTABLE. CALL LIGHT KEPT 
WITHIN REACH. PT'S BED IN LOWEST, LOCKED POSITION WITH SR X3. WILL ENDORSE TO INCOMING NIGHT 
NURSE FOR VIOLA.

## 2020-07-14 NOTE — NUR
MS RN NOTE 

 SPOKE WITH DR KOWALSKI NOTIFIED THAT PATIENT IS NEGATIVE FOR COVID -19 ,OK TO TRANSFER TO GPS 
SO

## 2020-07-14 NOTE — NUR
MS/RN NOTES:



PT IS MED COMPLIANT AND TOOK SCHEDULED MEDS (ZYPREXA 10MG PO AND PARLODEL 2.5MG) TOLERATED 
WELL. WILL CONTINUE TO MONITOR. PT IS IN A CALM MANNER AT THIS TIME.

## 2020-07-14 NOTE — NUR
MS/RN NOTES:



PT IS AGITATED. STATED THAT SHE IS HEARING VOICES THAT "SHE IS BEING RAPED". ORIENTED PT 
BACK TO REALITY AND THAT SHE IS IN THE HOSPITAL, SAFE. PER NURSING ASSESSMENT, WHEN PT IS 
ASKED IF SHE HAS THOUGHTS OF HARMING HERSELF, SHE STATED "THE VOICES ARE TELLING ME TO." SHE 
ADDED THAT SHE HAS NO VISUAL HALLUCINATIONS AND THAT SHE HAS NO THOUGHTS OF HARMING OTHERS. 
WILL CONTINUE TO MONITOR PT. ACCORDINGLY.

## 2020-07-14 NOTE — NUR
RN NOTE



RECEIVED PATIENT AROUND THIS TIME IN BED, SLEEPING COMFORTABLY. ON ISOLATION FOR R/O COVID. 
SITTER NEXT TO PATIENT'S ROOM. WILL CONTINUE TO MONITOR.

## 2020-07-14 NOTE — NUR
RN NOTE



PATIENT REMAINED STABLE THROUGHOUT THE NIGHT. NO SIGNIFICANT CHANGES NOTED. ALL NEEDS 
ATTENDED AND MET. WILL ENDORSE TO AM SHIFT RN FOR CONTINUATION OF CARE.

## 2020-07-14 NOTE — NUR
MS/RN OPENING NOTES



RECEIVED PT WITH A [facility] STATUS. PT AWAKE, A/O X3. SITTER AT BEDSIDE. PT TOLERATING RA WITH NO 
ACUTE RESPIRATORY DISTRESS NOTED. PT DENIES ANY PAIN OR DISCOMFORT AT THIS TIME. NO PIV 
NOTED. VITALS WNL AND STABLE. PT KEPT COMFORTABLE. PER DAY SHIFT NURSE, PT IS SUPPOSED TO BE 
IN GPS BUT THEY DIDN'T HAVE AN AVAILABLE BED. WILL FOLLOW UP. CALL LIGHT KEPT WITHIN REACH. 
PT'S BED IN LOWEST, LOCKED POSITION WITH SR X3. WILL CONTINUE TO MONITOR ACCORDINGLY.

## 2020-07-15 ENCOUNTER — HOSPITAL ENCOUNTER (INPATIENT)
Dept: HOSPITAL 54 - GPS | Age: 55
LOS: 14 days | Discharge: NURSING, REHAB OR LONG-TERM CARE | DRG: 885 | End: 2020-07-29
Attending: NURSE PRACTITIONER | Admitting: PSYCHIATRY & NEUROLOGY
Payer: MEDICARE

## 2020-07-15 VITALS — SYSTOLIC BLOOD PRESSURE: 123 MMHG | DIASTOLIC BLOOD PRESSURE: 82 MMHG

## 2020-07-15 VITALS — DIASTOLIC BLOOD PRESSURE: 81 MMHG | SYSTOLIC BLOOD PRESSURE: 139 MMHG

## 2020-07-15 VITALS — BODY MASS INDEX: 24.74 KG/M2 | HEIGHT: 60 IN | WEIGHT: 126 LBS

## 2020-07-15 VITALS — SYSTOLIC BLOOD PRESSURE: 129 MMHG | DIASTOLIC BLOOD PRESSURE: 85 MMHG

## 2020-07-15 VITALS — SYSTOLIC BLOOD PRESSURE: 149 MMHG | DIASTOLIC BLOOD PRESSURE: 94 MMHG

## 2020-07-15 DIAGNOSIS — F23: ICD-10-CM

## 2020-07-15 DIAGNOSIS — I10: ICD-10-CM

## 2020-07-15 DIAGNOSIS — F32.9: ICD-10-CM

## 2020-07-15 DIAGNOSIS — F41.9: ICD-10-CM

## 2020-07-15 DIAGNOSIS — E22.1: ICD-10-CM

## 2020-07-15 DIAGNOSIS — F22: ICD-10-CM

## 2020-07-15 DIAGNOSIS — F25.0: Primary | ICD-10-CM

## 2020-07-15 LAB — CREAT SERPL-MCNC: 0.6 MG/DL (ref 0.6–1.3)

## 2020-07-15 RX ADMIN — TEMAZEPAM PRN MG: 7.5 CAPSULE ORAL at 22:36

## 2020-07-15 RX ADMIN — GABAPENTIN SCH MG: 300 CAPSULE ORAL at 18:35

## 2020-07-15 RX ADMIN — VENLAFAXINE HYDROCHLORIDE SCH MG: 150 CAPSULE, EXTENDED RELEASE ORAL at 11:42

## 2020-07-15 RX ADMIN — LORAZEPAM PRN MG: 0.5 TABLET ORAL at 20:42

## 2020-07-15 RX ADMIN — GABAPENTIN SCH MG: 300 CAPSULE ORAL at 12:43

## 2020-07-15 RX ADMIN — ZIPRASIDONE HYDROCHLORIDE SCH MG: 20 CAPSULE ORAL at 11:42

## 2020-07-15 RX ADMIN — LISINOPRIL SCH MG: 10 TABLET ORAL at 09:41

## 2020-07-15 RX ADMIN — OLANZAPINE SCH MG: 10 TABLET ORAL at 21:59

## 2020-07-15 NOTE — NUR
GPS ADMISSION NOTE:



RECEIVED PATIENT FROM [facility], INITIALLY FROM [facility]. PATIENT ON A 5250 HOLD 
^7/28/2020. PER HOLD PATIENT IS PARANOID WITH AUDITORY HALLUCINATION AND HAVE NO PLANS OF 
CARING FOR SELF. PT WAS ORIGINALLY PLACED  ON 5150 HOLD BECAUSE [facility] CALLED 
[facility] ER REQUESTING FOR PSYCHIATRIC  EVALUATION OF PT D/T INCREASED AGITATION, CONFUSION ANS 
STRIKING OUT BEHAVIOR. 



UPON FACE TO FACE ASSESSMENT, PT IS A/O X3, PARANOID, LABILE, REDIRECTABLE, FLAT AFFECT. 
DENIES SI, HI, AT THIS TIME. NO S/S OF ACUTE DISTRESS. NO SOB NOTED. PATIENT'S BREATHING IS 
EVEN AND UNLABORED WITH EQUAL RISE AND FALL OF THE CHEST. ON ROOM AIR WITH SPO2 OF 98%. PT 
IS AMBULATORY,  ALL PATIENT NEEDS MET. SKIN ASSESSMENT DONE AND PICTURES TAKEN AND PLACED IN 
CHART. ACCU-CHEK DONE, PT BLOOD SUGAR IS 101MG/DL. PATIENT SIGNED ALL ADMISSION PAPERWORK. 
PATIENT ADVISED OF HER HOLD AND PT RIGHTS BOOKLET GIVEN. PATIENT IS UNDER THE PSYCHIATRIC 
CARE OF DR. SCHAEFER AND MEDICAL CARE OF [facility]. PATIENT  BELONGINGS WERE INVENTORIED AND 
CHECKED FOR CONTRABAND. CONTRABAND TAKEN AND SENT TO SUPERVISORS OFFICE. PATIENT ADVANCED 
DIRECTIVE PREFERENCE, AND OTHER NECESSARY PAPERWORK COMPLETED. PATIENT ORIENTED TO ROOM, 
FLOOR/UNIT AND STAFF. PT COMFORTABLE AND CURRENTLY LAYING ON BED. SAFETY MEASURES INITIATED. 
CARE PLAN STARTED. PATIENT BED IN LOW LOCKED POSITION WITH BED ALARM ON AND SIDE RAILS UP 
X2. CALL LIGHT WITHIN REACH OF THE PATIENT. WILL CONTINUE TO MONITOR AND REASSESS FOR ANY 
CHANGES [id number]. WILL ATTEND TO ALL MD ADMITTING ORDERS AND ENDORSE TO AM NURSE.

## 2020-07-15 NOTE — NUR
INITIAL DISCHARGE PLAN: Pt will return to [facility] Address: [address], [place] [address] Phone: [phone]. AAMIR spoke with Mayra, admissions 
coordinator who states pt is able to return once stable for discharge. AAMIR will help form a 
safe and proper discharge in collaboration with MD.

## 2020-07-15 NOTE — NUR
FAMILY CONTACT: AAMIR spoke with pts brother Anatoliy [phone] to inform him of pts 
readmission to the psych unit. Brother stated that he is aware and states that he is pts 
primary contact. AAMIR informed him of pts treatment and discharge plan back to [facility] Address: [address] [place] [address] Phone: [phone] 
and brother agreed with discharge plan.

## 2020-07-15 NOTE — NUR
FACILITY CONTACT: SW contacted [facility] Address: [address] [place] [address] Phone: [phone] and spoke with Mayra, admissions coordinator who 
states pt is able to return once stable for discharge.

## 2020-07-15 NOTE — NUR
MS/RN DISCHARGE NOTES:



PT DISCHARGE TEACHING GIVEN. PT IS MEDICALLY CLEARED AND WILL BE DISCHARGED TO GPS IN ROOM 
214-A. PT IS STABLE. VS WNL AND STABLE. A/O X3. PT TOLERATING RA WITH NO ACUTE RESPIRATORY 
DISTRESS NOTED. PT DENIES ANY PAIN OR DISCOMFORT AT THIS TIME. NO PIV NOTED. ALL NEEDS MET 
AND RENDERED. BELONGINGS LIST CHECKED AND SIGNED. EXIT CARE EXPLAINED ANS SIGNED. REPORT 
WILL BE GIVEN AT BEDSIDE IN GPS TO RN ALFRED. PT LEFT UNIT AT 0440 VIA WHEELCHAIR.

## 2020-07-16 VITALS — SYSTOLIC BLOOD PRESSURE: 100 MMHG | DIASTOLIC BLOOD PRESSURE: 69 MMHG

## 2020-07-16 VITALS — DIASTOLIC BLOOD PRESSURE: 73 MMHG | SYSTOLIC BLOOD PRESSURE: 117 MMHG

## 2020-07-16 VITALS — SYSTOLIC BLOOD PRESSURE: 120 MMHG | DIASTOLIC BLOOD PRESSURE: 77 MMHG

## 2020-07-16 LAB
CHOLEST SERPL-MCNC: 206 MG/DL (ref ?–200)
HDLC SERPL-MCNC: 39 MG/DL (ref 40–60)
LDLC SERPL DIRECT ASSAY-MCNC: 148 MG/DL (ref 0–99)
TRIGL SERPL-MCNC: 136 MG/DL (ref 30–150)

## 2020-07-16 RX ADMIN — LISINOPRIL SCH MG: 10 TABLET ORAL at 09:01

## 2020-07-16 RX ADMIN — GABAPENTIN SCH MG: 300 CAPSULE ORAL at 13:04

## 2020-07-16 RX ADMIN — VENLAFAXINE HYDROCHLORIDE SCH MG: 150 CAPSULE, EXTENDED RELEASE ORAL at 08:55

## 2020-07-16 RX ADMIN — OLANZAPINE SCH MG: 10 TABLET ORAL at 21:27

## 2020-07-16 RX ADMIN — LORAZEPAM PRN MG: 0.5 TABLET ORAL at 22:16

## 2020-07-16 RX ADMIN — GABAPENTIN SCH MG: 300 CAPSULE ORAL at 08:55

## 2020-07-16 RX ADMIN — TEMAZEPAM PRN MG: 7.5 CAPSULE ORAL at 23:18

## 2020-07-16 RX ADMIN — ZIPRASIDONE HYDROCHLORIDE SCH MG: 20 CAPSULE ORAL at 08:55

## 2020-07-16 RX ADMIN — GABAPENTIN SCH MG: 300 CAPSULE ORAL at 17:19

## 2020-07-16 NOTE — NUR
OPENING NOTES



RECEIVED PATIENT LAYING ON BED, AWAKE, ALERT AND ORIENTED X 2-3. APPEARS DEPRESSED, FLAT 
AFFECT, ANXIOUS, RESTLESS, RESPONDING TO INTERNAL STIMULI, LOOSE ASSOCIATIONS, DISHEVELED, 
COOPERATIVE, ABLE TO MAKE NEEDS KNOWN. NO SIGNS OF DISTRESS AT THIS TIME. NO RESPIRATORY 
DISTRESS AT THIS TIME WITH EVEN NON-LABORED BREATHING, ON ROOM AIR. DENIES SI AT THIS TIME. 
SAFETY AND FALL PRECAUTION IMPLEMENTED, Q15 MINUTES OBSERVATION CONTINUED. WILL CONTINUE TO 
MONITOR PATIENT FOR PAIN, MOOD, SAFETY AND BEHAVIOR.

## 2020-07-16 NOTE — NUR
NURSES NOTES:



PATIENT NOTED TO BE TALKING CONTINUOUSLY, RAMBLING TO HERSELF "YOU'RE A PIECE OF Church 
SHIT, SACRIFICE, I'M RAPING YOU , I'M PUNISHING YOU!" THESE PHRASES OVER AND OVER AGAIN. 
WHEN ASSESSED, PATIENT TOLD WRITER THAT SHE IS HEARING VOICES AND THAT THESE VOICES ARE 
TELLING HER THAT THEY WILL HURT HER. WRITER THEN GAVE THE PATIENT HER PRN DOSE OF ATIVAN  AS 
ORDERED. WILL MONITOR PATIENT.

## 2020-07-17 VITALS — DIASTOLIC BLOOD PRESSURE: 74 MMHG | SYSTOLIC BLOOD PRESSURE: 108 MMHG

## 2020-07-17 VITALS — DIASTOLIC BLOOD PRESSURE: 62 MMHG | SYSTOLIC BLOOD PRESSURE: 103 MMHG

## 2020-07-17 VITALS — SYSTOLIC BLOOD PRESSURE: 108 MMHG | DIASTOLIC BLOOD PRESSURE: 74 MMHG

## 2020-07-17 VITALS — SYSTOLIC BLOOD PRESSURE: 114 MMHG | DIASTOLIC BLOOD PRESSURE: 77 MMHG

## 2020-07-17 RX ADMIN — ZIPRASIDONE HYDROCHLORIDE SCH MG: 20 CAPSULE ORAL at 08:22

## 2020-07-17 RX ADMIN — LORAZEPAM PRN MG: 0.5 TABLET ORAL at 23:03

## 2020-07-17 RX ADMIN — OLANZAPINE SCH MG: 10 TABLET ORAL at 21:37

## 2020-07-17 RX ADMIN — GABAPENTIN SCH MG: 300 CAPSULE ORAL at 12:20

## 2020-07-17 RX ADMIN — GABAPENTIN SCH MG: 300 CAPSULE ORAL at 08:22

## 2020-07-17 RX ADMIN — GABAPENTIN SCH MG: 300 CAPSULE ORAL at 17:30

## 2020-07-17 RX ADMIN — LISINOPRIL SCH MG: 10 TABLET ORAL at 08:23

## 2020-07-17 RX ADMIN — VENLAFAXINE HYDROCHLORIDE SCH MG: 150 CAPSULE, EXTENDED RELEASE ORAL at 08:22

## 2020-07-17 NOTE — NUR
INDIVIDUAL INTERVENTION: SW met with pt at bedside, pt was talking to self and when she saw 
SW she stated, "oh hi, come on in how can I help you." SW then asked pt how she was doing 
and pt stated, "Im hearing voices and they are telling me not to talk to you right now." Pt 
then closed her eyes and did not engage in conversation vivian KIRBY. Pt remains isolative and 
withdrawn.

## 2020-07-17 NOTE — NUR
GPS RN NOTE: ANXIETY



PATIENT NOTED TO BE HYPERVERBAL, ANXIOUS, RESTLESS CONTINUOUSLY, RAMBLING/TALKING TO HERSELF 
"YOU'RE A PIECE OF Scientologist SHIT, SACRIFICE, I'M RAPING YOU , I'M PUNISHING YOU. I HATE YOU 
MORE THAN ANYONE ELSE." PT. KEEP REPEATING THESE PHRASES OVER AND OVER AGAIN. WHEN ASSESSED, 
PATIENT TOLD RN THAT SHE IS HEARING VOICES AND THAT THESE VOICES ARE TELLING HER THAT THEY 
WILL HURT HER.  PRN DOSE OF ATIVAN  0.5 MG GIVEN AS ORDERED. WILL CONTINUE TO MONITOR THE 
PATIENT.

## 2020-07-18 VITALS — SYSTOLIC BLOOD PRESSURE: 93 MMHG | DIASTOLIC BLOOD PRESSURE: 55 MMHG

## 2020-07-18 VITALS — DIASTOLIC BLOOD PRESSURE: 67 MMHG | SYSTOLIC BLOOD PRESSURE: 123 MMHG

## 2020-07-18 VITALS — SYSTOLIC BLOOD PRESSURE: 114 MMHG | DIASTOLIC BLOOD PRESSURE: 66 MMHG

## 2020-07-18 VITALS — DIASTOLIC BLOOD PRESSURE: 66 MMHG | SYSTOLIC BLOOD PRESSURE: 114 MMHG

## 2020-07-18 RX ADMIN — GABAPENTIN SCH MG: 300 CAPSULE ORAL at 08:48

## 2020-07-18 RX ADMIN — GABAPENTIN SCH MG: 300 CAPSULE ORAL at 12:00

## 2020-07-18 RX ADMIN — VENLAFAXINE HYDROCHLORIDE SCH MG: 150 CAPSULE, EXTENDED RELEASE ORAL at 08:48

## 2020-07-18 RX ADMIN — GABAPENTIN SCH MG: 300 CAPSULE ORAL at 16:10

## 2020-07-18 RX ADMIN — LISINOPRIL SCH MG: 10 TABLET ORAL at 08:48

## 2020-07-18 RX ADMIN — OLANZAPINE SCH MG: 10 TABLET ORAL at 21:31

## 2020-07-18 RX ADMIN — ZIPRASIDONE HYDROCHLORIDE SCH MG: 20 CAPSULE ORAL at 08:48

## 2020-07-19 VITALS — DIASTOLIC BLOOD PRESSURE: 60 MMHG | SYSTOLIC BLOOD PRESSURE: 111 MMHG

## 2020-07-19 VITALS — DIASTOLIC BLOOD PRESSURE: 69 MMHG | SYSTOLIC BLOOD PRESSURE: 111 MMHG

## 2020-07-19 VITALS — SYSTOLIC BLOOD PRESSURE: 111 MMHG | DIASTOLIC BLOOD PRESSURE: 64 MMHG

## 2020-07-19 RX ADMIN — ZIPRASIDONE HYDROCHLORIDE SCH MG: 20 CAPSULE ORAL at 07:59

## 2020-07-19 RX ADMIN — GABAPENTIN SCH MG: 300 CAPSULE ORAL at 17:36

## 2020-07-19 RX ADMIN — VENLAFAXINE HYDROCHLORIDE SCH MG: 75 CAPSULE, EXTENDED RELEASE ORAL at 08:00

## 2020-07-19 RX ADMIN — GABAPENTIN SCH MG: 300 CAPSULE ORAL at 07:59

## 2020-07-19 RX ADMIN — LISINOPRIL SCH MG: 10 TABLET ORAL at 07:59

## 2020-07-19 RX ADMIN — GABAPENTIN SCH MG: 300 CAPSULE ORAL at 12:57

## 2020-07-19 RX ADMIN — OLANZAPINE SCH MG: 10 TABLET ORAL at 21:07

## 2020-07-20 VITALS — SYSTOLIC BLOOD PRESSURE: 133 MMHG | DIASTOLIC BLOOD PRESSURE: 70 MMHG

## 2020-07-20 VITALS — SYSTOLIC BLOOD PRESSURE: 106 MMHG | DIASTOLIC BLOOD PRESSURE: 61 MMHG

## 2020-07-20 VITALS — SYSTOLIC BLOOD PRESSURE: 126 MMHG | DIASTOLIC BLOOD PRESSURE: 77 MMHG

## 2020-07-20 RX ADMIN — GABAPENTIN SCH MG: 300 CAPSULE ORAL at 17:05

## 2020-07-20 RX ADMIN — VENLAFAXINE HYDROCHLORIDE SCH MG: 75 CAPSULE, EXTENDED RELEASE ORAL at 08:04

## 2020-07-20 RX ADMIN — ZIPRASIDONE HYDROCHLORIDE SCH MG: 20 CAPSULE ORAL at 08:03

## 2020-07-20 RX ADMIN — OLANZAPINE SCH MG: 10 TABLET ORAL at 21:25

## 2020-07-20 RX ADMIN — LORAZEPAM PRN MG: 0.5 TABLET ORAL at 20:07

## 2020-07-20 RX ADMIN — GABAPENTIN SCH MG: 300 CAPSULE ORAL at 12:12

## 2020-07-20 RX ADMIN — GABAPENTIN SCH MG: 300 CAPSULE ORAL at 08:04

## 2020-07-20 RX ADMIN — LISINOPRIL SCH MG: 10 TABLET ORAL at 08:04

## 2020-07-20 NOTE — NUR
INDIVIDUAL INTERVENTION: SW met with pt at bedside to inviter her to group therapy. Pt was 
on the phone and stated she was talking to her mom.

## 2020-07-20 NOTE — NUR
GPS RN NOTE: ANXIETY/AGITATION

PATIENT IS AGITATED, YELLING, SCREAMING, ANXIOUS, PARANOID, RESTLESS, HYPERVERBAL AT THIS 
TIME, PRN ATIVAN 0.5 MG 1 TAB PO GIVEN, WILL CONTINUE TO MONITOR .

## 2020-07-20 NOTE — NUR
RN NOTE- PT IN ROOM QUIET WITHDRAWN ISOLATIVE POOR EYE CONTACT SEEMS GUARDED AND PARANOID 
THOUGH IS MED COMPLIANT. RESPONDS WITH VERY SHORT REPLIES TO QUERY DENIES ALL

## 2020-07-21 VITALS — SYSTOLIC BLOOD PRESSURE: 123 MMHG | DIASTOLIC BLOOD PRESSURE: 74 MMHG

## 2020-07-21 VITALS — SYSTOLIC BLOOD PRESSURE: 100 MMHG | DIASTOLIC BLOOD PRESSURE: 59 MMHG

## 2020-07-21 VITALS — DIASTOLIC BLOOD PRESSURE: 64 MMHG | SYSTOLIC BLOOD PRESSURE: 114 MMHG

## 2020-07-21 RX ADMIN — VENLAFAXINE HYDROCHLORIDE SCH MG: 75 CAPSULE, EXTENDED RELEASE ORAL at 08:10

## 2020-07-21 RX ADMIN — ZIPRASIDONE HYDROCHLORIDE SCH MG: 20 CAPSULE ORAL at 08:11

## 2020-07-21 RX ADMIN — GABAPENTIN SCH MG: 300 CAPSULE ORAL at 12:35

## 2020-07-21 RX ADMIN — LORAZEPAM PRN MG: 0.5 TABLET ORAL at 20:14

## 2020-07-21 RX ADMIN — OLANZAPINE SCH MG: 10 TABLET ORAL at 21:54

## 2020-07-21 RX ADMIN — GABAPENTIN SCH MG: 300 CAPSULE ORAL at 08:11

## 2020-07-21 RX ADMIN — GABAPENTIN SCH MG: 300 CAPSULE ORAL at 16:26

## 2020-07-21 RX ADMIN — LISINOPRIL SCH MG: 10 TABLET ORAL at 08:11

## 2020-07-21 NOTE — NUR
GPS RN NOTE: ANXIETY



PT. APPEARS TO BE ANXIOUS. ADMINISTERED ATIVAN 0.5 MG PO PRN AS ORDERED. WILL CONTINUE TO 
MONITOR FOR SAFETY AND BEHAVIOR

## 2020-07-21 NOTE — NUR
RN NOTE- QUIET ALERT ORIENTED TO PERSON PLACE SEEMS GUARDED AND PARANOID THOUGH IS MED 
COMPLIANT. DOES NOT ENGAGE IN PADILLA PERIODICALLY THOUGH KEEPS TO SELF DENIES ALL

## 2020-07-22 VITALS — SYSTOLIC BLOOD PRESSURE: 113 MMHG | DIASTOLIC BLOOD PRESSURE: 71 MMHG

## 2020-07-22 VITALS — SYSTOLIC BLOOD PRESSURE: 116 MMHG | DIASTOLIC BLOOD PRESSURE: 81 MMHG

## 2020-07-22 VITALS — SYSTOLIC BLOOD PRESSURE: 105 MMHG | DIASTOLIC BLOOD PRESSURE: 51 MMHG

## 2020-07-22 RX ADMIN — VENLAFAXINE HYDROCHLORIDE SCH MG: 75 CAPSULE, EXTENDED RELEASE ORAL at 08:26

## 2020-07-22 RX ADMIN — GABAPENTIN SCH MG: 300 CAPSULE ORAL at 13:05

## 2020-07-22 RX ADMIN — GABAPENTIN SCH MG: 300 CAPSULE ORAL at 08:25

## 2020-07-22 RX ADMIN — LISINOPRIL SCH MG: 10 TABLET ORAL at 08:26

## 2020-07-22 RX ADMIN — GABAPENTIN SCH MG: 300 CAPSULE ORAL at 16:33

## 2020-07-22 RX ADMIN — OLANZAPINE SCH MG: 10 TABLET ORAL at 21:20

## 2020-07-22 RX ADMIN — ZIPRASIDONE HYDROCHLORIDE SCH MG: 20 CAPSULE ORAL at 08:26

## 2020-07-23 VITALS — SYSTOLIC BLOOD PRESSURE: 123 MMHG | DIASTOLIC BLOOD PRESSURE: 78 MMHG

## 2020-07-23 VITALS — DIASTOLIC BLOOD PRESSURE: 52 MMHG | SYSTOLIC BLOOD PRESSURE: 104 MMHG

## 2020-07-23 VITALS — SYSTOLIC BLOOD PRESSURE: 103 MMHG | DIASTOLIC BLOOD PRESSURE: 59 MMHG

## 2020-07-23 RX ADMIN — GABAPENTIN SCH MG: 300 CAPSULE ORAL at 16:42

## 2020-07-23 RX ADMIN — VENLAFAXINE HYDROCHLORIDE SCH MG: 75 CAPSULE, EXTENDED RELEASE ORAL at 08:20

## 2020-07-23 RX ADMIN — OLANZAPINE SCH MG: 10 TABLET ORAL at 21:13

## 2020-07-23 RX ADMIN — ZIPRASIDONE HYDROCHLORIDE SCH MG: 20 CAPSULE ORAL at 08:20

## 2020-07-23 RX ADMIN — GABAPENTIN SCH MG: 300 CAPSULE ORAL at 12:10

## 2020-07-23 RX ADMIN — LISINOPRIL SCH MG: 10 TABLET ORAL at 08:20

## 2020-07-23 RX ADMIN — GABAPENTIN SCH MG: 300 CAPSULE ORAL at 08:20

## 2020-07-23 NOTE — NUR
INDIVIDUAL INTERVENTION: SW met with pt at bedside to inviter her to group therapy. Pt 
refused to join and stated that she is tired.

## 2020-07-24 VITALS — DIASTOLIC BLOOD PRESSURE: 68 MMHG | SYSTOLIC BLOOD PRESSURE: 112 MMHG

## 2020-07-24 VITALS — SYSTOLIC BLOOD PRESSURE: 112 MMHG | DIASTOLIC BLOOD PRESSURE: 68 MMHG

## 2020-07-24 VITALS — DIASTOLIC BLOOD PRESSURE: 72 MMHG | SYSTOLIC BLOOD PRESSURE: 114 MMHG

## 2020-07-24 VITALS — SYSTOLIC BLOOD PRESSURE: 110 MMHG | DIASTOLIC BLOOD PRESSURE: 70 MMHG

## 2020-07-24 RX ADMIN — OLANZAPINE SCH MG: 10 TABLET ORAL at 21:42

## 2020-07-24 RX ADMIN — VENLAFAXINE HYDROCHLORIDE SCH MG: 75 CAPSULE, EXTENDED RELEASE ORAL at 08:26

## 2020-07-24 RX ADMIN — GABAPENTIN SCH MG: 300 CAPSULE ORAL at 16:37

## 2020-07-24 RX ADMIN — LISINOPRIL SCH MG: 10 TABLET ORAL at 08:26

## 2020-07-24 RX ADMIN — GABAPENTIN SCH MG: 300 CAPSULE ORAL at 08:26

## 2020-07-24 RX ADMIN — ZIPRASIDONE HYDROCHLORIDE SCH MG: 20 CAPSULE ORAL at 08:26

## 2020-07-24 RX ADMIN — LORAZEPAM PRN MG: 0.5 TABLET ORAL at 20:22

## 2020-07-24 RX ADMIN — TEMAZEPAM PRN MG: 7.5 CAPSULE ORAL at 23:45

## 2020-07-24 RX ADMIN — GABAPENTIN SCH MG: 300 CAPSULE ORAL at 12:54

## 2020-07-24 NOTE — NUR
GPS RN NOTE: INSOMNIA



PATIENT NOTED TO BE UNABLE TO SLEEP, WALKING IN THE HALLWAY, TALKING TO HERSELF. PRN 
RESTORIL 7.5 MG 1 CAP PO GIVEN. WILL REASSESS FOR EFFECTIVENESS.

## 2020-07-24 NOTE — NUR
GPS RN NOTE: ANXIETY



PATIENT NOTED TO BE ANXIOUS & RESTLESS, TALKING TO HERSELF. PRN ATIVAN 0.5 MG 1 TAB PO GIVEN 
AS ORDERED. WILL MONITOR CLOSELY FOR ANY CHANGES.

## 2020-07-25 VITALS — SYSTOLIC BLOOD PRESSURE: 107 MMHG | DIASTOLIC BLOOD PRESSURE: 64 MMHG

## 2020-07-25 VITALS — DIASTOLIC BLOOD PRESSURE: 78 MMHG | SYSTOLIC BLOOD PRESSURE: 145 MMHG

## 2020-07-25 VITALS — DIASTOLIC BLOOD PRESSURE: 53 MMHG | SYSTOLIC BLOOD PRESSURE: 100 MMHG

## 2020-07-25 VITALS — DIASTOLIC BLOOD PRESSURE: 70 MMHG | SYSTOLIC BLOOD PRESSURE: 115 MMHG

## 2020-07-25 RX ADMIN — OLANZAPINE SCH MG: 10 TABLET ORAL at 22:04

## 2020-07-25 RX ADMIN — GABAPENTIN SCH MG: 300 CAPSULE ORAL at 08:28

## 2020-07-25 RX ADMIN — GABAPENTIN SCH MG: 300 CAPSULE ORAL at 12:08

## 2020-07-25 RX ADMIN — GABAPENTIN SCH MG: 300 CAPSULE ORAL at 16:36

## 2020-07-25 RX ADMIN — LISINOPRIL SCH MG: 10 TABLET ORAL at 08:28

## 2020-07-25 RX ADMIN — ZIPRASIDONE HYDROCHLORIDE SCH MG: 20 CAPSULE ORAL at 08:28

## 2020-07-25 RX ADMIN — VENLAFAXINE HYDROCHLORIDE SCH MG: 75 CAPSULE, EXTENDED RELEASE ORAL at 08:28

## 2020-07-26 VITALS — SYSTOLIC BLOOD PRESSURE: 118 MMHG | DIASTOLIC BLOOD PRESSURE: 70 MMHG

## 2020-07-26 VITALS — DIASTOLIC BLOOD PRESSURE: 72 MMHG | SYSTOLIC BLOOD PRESSURE: 109 MMHG

## 2020-07-26 VITALS — SYSTOLIC BLOOD PRESSURE: 124 MMHG | DIASTOLIC BLOOD PRESSURE: 71 MMHG

## 2020-07-26 LAB
BASOPHILS # BLD AUTO: 0 /CMM (ref 0–0.2)
BASOPHILS NFR BLD AUTO: 0.4 % (ref 0–2)
BUN SERPL-MCNC: 15 MG/DL (ref 7–18)
CALCIUM SERPL-MCNC: 9.1 MG/DL (ref 8.5–10.1)
CHLORIDE SERPL-SCNC: 100 MMOL/L (ref 98–107)
CO2 SERPL-SCNC: 24 MMOL/L (ref 21–32)
CREAT SERPL-MCNC: 0.8 MG/DL (ref 0.6–1.3)
EOSINOPHIL NFR BLD AUTO: 1.5 % (ref 0–6)
GLUCOSE SERPL-MCNC: 177 MG/DL (ref 74–106)
HCT VFR BLD AUTO: 44 % (ref 33–45)
HGB BLD-MCNC: 14.3 G/DL (ref 11.5–14.8)
LYMPHOCYTES NFR BLD AUTO: 1.3 /CMM (ref 0.8–4.8)
LYMPHOCYTES NFR BLD AUTO: 20.4 % (ref 20–44)
MCHC RBC AUTO-ENTMCNC: 33 G/DL (ref 31–36)
MCV RBC AUTO: 93 FL (ref 82–100)
MONOCYTES NFR BLD AUTO: 0.4 /CMM (ref 0.1–1.3)
MONOCYTES NFR BLD AUTO: 6.2 % (ref 2–12)
NEUTROPHILS # BLD AUTO: 4.4 /CMM (ref 1.8–8.9)
NEUTROPHILS NFR BLD AUTO: 71.5 % (ref 43–81)
PLATELET # BLD AUTO: 273 /CMM (ref 150–450)
POTASSIUM SERPL-SCNC: 3.9 MMOL/L (ref 3.5–5.1)
RBC # BLD AUTO: 4.7 MIL/UL (ref 4–5.2)
SODIUM SERPL-SCNC: 136 MMOL/L (ref 136–145)
WBC NRBC COR # BLD AUTO: 6.2 K/UL (ref 4.3–11)

## 2020-07-26 RX ADMIN — LISINOPRIL SCH MG: 10 TABLET ORAL at 08:23

## 2020-07-26 RX ADMIN — VENLAFAXINE HYDROCHLORIDE SCH MG: 75 CAPSULE, EXTENDED RELEASE ORAL at 08:22

## 2020-07-26 RX ADMIN — GABAPENTIN SCH MG: 300 CAPSULE ORAL at 12:13

## 2020-07-26 RX ADMIN — GABAPENTIN SCH MG: 300 CAPSULE ORAL at 08:23

## 2020-07-26 RX ADMIN — ZIPRASIDONE HYDROCHLORIDE SCH MG: 20 CAPSULE ORAL at 08:23

## 2020-07-26 RX ADMIN — OLANZAPINE SCH MG: 10 TABLET ORAL at 22:03

## 2020-07-26 RX ADMIN — GABAPENTIN SCH MG: 300 CAPSULE ORAL at 16:38

## 2020-07-27 VITALS — DIASTOLIC BLOOD PRESSURE: 59 MMHG | SYSTOLIC BLOOD PRESSURE: 99 MMHG

## 2020-07-27 VITALS — DIASTOLIC BLOOD PRESSURE: 50 MMHG | SYSTOLIC BLOOD PRESSURE: 99 MMHG

## 2020-07-27 VITALS — DIASTOLIC BLOOD PRESSURE: 62 MMHG | SYSTOLIC BLOOD PRESSURE: 121 MMHG

## 2020-07-27 VITALS — DIASTOLIC BLOOD PRESSURE: 63 MMHG | SYSTOLIC BLOOD PRESSURE: 106 MMHG

## 2020-07-27 RX ADMIN — LISINOPRIL SCH MG: 10 TABLET ORAL at 08:14

## 2020-07-27 RX ADMIN — GABAPENTIN SCH MG: 300 CAPSULE ORAL at 17:12

## 2020-07-27 RX ADMIN — OLANZAPINE SCH MG: 10 TABLET ORAL at 21:59

## 2020-07-27 RX ADMIN — GABAPENTIN SCH MG: 300 CAPSULE ORAL at 12:14

## 2020-07-27 RX ADMIN — GABAPENTIN SCH MG: 300 CAPSULE ORAL at 08:14

## 2020-07-27 RX ADMIN — ZIPRASIDONE HYDROCHLORIDE SCH MG: 20 CAPSULE ORAL at 08:14

## 2020-07-27 RX ADMIN — VENLAFAXINE HYDROCHLORIDE SCH MG: 75 CAPSULE, EXTENDED RELEASE ORAL at 08:14

## 2020-07-27 NOTE — NUR
RN NOTE- PT IN ROOM AND PADILLA. ALERT ORIENTED TO PERSON PLACE PURPOSE STATES AH VOICES 
THREATENING HER THOUGH DECREASED IN FREQUENCY  PO INTAKE GOOD MED COMPLIANT NEEDS MET

## 2020-07-28 VITALS — DIASTOLIC BLOOD PRESSURE: 68 MMHG | SYSTOLIC BLOOD PRESSURE: 105 MMHG

## 2020-07-28 VITALS — DIASTOLIC BLOOD PRESSURE: 59 MMHG | SYSTOLIC BLOOD PRESSURE: 105 MMHG

## 2020-07-28 VITALS — DIASTOLIC BLOOD PRESSURE: 75 MMHG | SYSTOLIC BLOOD PRESSURE: 133 MMHG

## 2020-07-28 RX ADMIN — LISINOPRIL SCH MG: 10 TABLET ORAL at 08:24

## 2020-07-28 RX ADMIN — GABAPENTIN SCH MG: 300 CAPSULE ORAL at 16:31

## 2020-07-28 RX ADMIN — VENLAFAXINE HYDROCHLORIDE SCH MG: 75 CAPSULE, EXTENDED RELEASE ORAL at 08:23

## 2020-07-28 RX ADMIN — OLANZAPINE SCH MG: 10 TABLET ORAL at 21:08

## 2020-07-28 RX ADMIN — ZIPRASIDONE HYDROCHLORIDE SCH MG: 20 CAPSULE ORAL at 08:23

## 2020-07-28 RX ADMIN — GABAPENTIN SCH MG: 300 CAPSULE ORAL at 12:16

## 2020-07-28 RX ADMIN — GABAPENTIN SCH MG: 300 CAPSULE ORAL at 08:23

## 2020-07-28 NOTE — NUR
Facility Contact: AAMIR called [facility] [phone] and spoke to Lucia in 
the admissions department regarding the pt being transferred back the next day. She stated 
that she would like updated notes of the pt and a negative COVID test. AAMIR stated that she 
will send the results of the COVID test the following day and the admissions coordinator 
stated that she will make room for the pt.

## 2020-07-28 NOTE — NUR
Facility Contact: AAMIR faxed updated notes to [facility] with attn to the fax 
number: [phone].

## 2020-07-28 NOTE — NUR
RN NOTE- PT QUIET THIS MORNING SLEEPING. AWAKENED FOR AM MEAL AND RX. MED COMPLIANT DENIES 
VOICES AH AT THIS TIME NEEDS MET DIRECTABLE SMILING A LITTLE THIS MORNING BETTER EYE CONTACT

## 2020-07-28 NOTE — NUR
GROUP NOTE: Group Topic: Depression

SW invited patient to attend group. patient refused to join group at this time without 
reason and despite encouragement.

## 2020-07-29 VITALS — SYSTOLIC BLOOD PRESSURE: 117 MMHG | DIASTOLIC BLOOD PRESSURE: 73 MMHG

## 2020-07-29 RX ADMIN — LISINOPRIL SCH MG: 10 TABLET ORAL at 08:08

## 2020-07-29 RX ADMIN — GABAPENTIN SCH MG: 300 CAPSULE ORAL at 12:08

## 2020-07-29 RX ADMIN — ZIPRASIDONE HYDROCHLORIDE SCH MG: 20 CAPSULE ORAL at 08:09

## 2020-07-29 RX ADMIN — VENLAFAXINE HYDROCHLORIDE SCH MG: 75 CAPSULE, EXTENDED RELEASE ORAL at 08:08

## 2020-07-29 RX ADMIN — GABAPENTIN SCH MG: 300 CAPSULE ORAL at 08:08

## 2020-07-29 NOTE — NUR
Family Contact: AAMIR called pts brother, Anatoliy ([phone]), and left a voicemail that 
informed him that the pt is going to be discharged [facility].

## 2020-07-29 NOTE — NUR
Discharge Note: Pt was discharged to [facility] located at [address] [place] [address]; [phone]. Pt was transported via [facility] at 1300. 
Pts brother, Anatoliy ([phone]), was made aware. Upon discharge, the pt appeared to be 
in a euthymic mood and presented with a distressed affect. Pt presented as alert and 
oriented x4 (time, place, self and situation). Pt denied both suicidal and homicidal 
ideation as well as auditory and visual hallucinations. Pt will continue to be under the 
care of psychiatrist, Dr. Khan, located at [address], [place] [address]; 
[phone] and internist, Dr. Mari, located at [address] [place] 
[address]; [phone]. Pt signed the Choice of Vendor form to return to the facility.

## 2020-07-29 NOTE — NUR
Dr. Khan gave an order to D/C to [facility] and to continue same meds 
including prn and to follow up with psych and medical doctors.

## 2020-07-29 NOTE — NUR
RN DISCHARGE NOTE- PT DC AT THIS TIME TO [facility] IN [place]. PT LEAVING 
VIA AMBULANCE. SHE IS ALERT ORIENTED TO PERSON PLACE TIME AND PURPOSE. CURRENTLY DENIES SI 
HI AH VH THOUGH HEARS PERIODIC VOICES. VS STABLE, SKIN INTACT, DC INSTRUCTIONS AND RX 
REVIEWED W PT AND AMBULANCE STAFF. REPORT CALLED TO BRIELLE AT FACILITY. PT VALUABLES RETURNED 
TO HER AND SIGNED FOR. ID [id number] REMOVED. ESCORTED OFF FACILITY

## 2020-07-29 NOTE — NUR
RN NOTE- PT BLUNTED AFFECT CALM QUIET IN HALLS ON UNIT PO INTAKE GOOD MED COMPLIANT SMILING 
ANTICIPATING DC DENIES SI HI AH VH AT PRESENT

## 2023-11-08 NOTE — NUR
GPS RN DISCHARGE NOTE:



PATIENT IS A 54 YEAR OLD  FEMALE DISCHARGED TO [facility] ([facility] 
PATIENT IS IN STABLE CONDITION. VSS. NO ACUTE DISTRESS NOTED. NO COMPLAINTS. COMPLIANT WITH 
MEDICATION MANAGEMENT. COOPERATIVE WITH PLAN OF CARE. PSYCHIATRIC TREATMENT PLANS MET. 
MEDICAL TREATMENT PLANS DEFERRED FOR CONTINUAL MONITORING. DENIES SI/HI AT THE TIME OF 
DISCHARGE. PRESENT AUDITORY HALLUCINATIONS BUT DENIES VISUAL HALLUCINATIONS. SKIN CHECK DONE 
AND SKIN IS INTACT. EDUCATED PATIENT ABOUT AFTERCARE WITH COPY PROVIDED. RETURNED PERSONAL 
BELONGINGS TO PATIENT. MEDICATIONS RECONCILED WITH ALONG WITH PSYCHIATRIC DISCHARGE ORDERS 
WITH DR SCHAEFER. MEDICAL MEDICATIONS RECONCILED WITH RICARDO MULLINS NP. DISCHARGE PAPERWORK 
SIGNED. FOR FOLLOW UP WITH PSYCHIATRIST AND INTERNIST WITHIN 1 WEEK.  PATIENT LEFT THE [facility] 
GPS VIA AMBULANCE. moderate